# Patient Record
Sex: MALE | Race: WHITE | Employment: FULL TIME | URBAN - METROPOLITAN AREA
[De-identification: names, ages, dates, MRNs, and addresses within clinical notes are randomized per-mention and may not be internally consistent; named-entity substitution may affect disease eponyms.]

---

## 2017-01-20 ENCOUNTER — HOSPITAL ENCOUNTER (OUTPATIENT)
Dept: CT IMAGING | Age: 67
Discharge: HOME | End: 2017-01-20
Attending: NURSE PRACTITIONER
Payer: COMMERCIAL

## 2017-01-20 VITALS — BODY MASS INDEX: 30.48 KG/M2 | HEIGHT: 72 IN | WEIGHT: 225 LBS

## 2017-01-20 DIAGNOSIS — R10.9 LEFT FLANK PAIN: ICD-10-CM

## 2017-01-20 DIAGNOSIS — Z87.442 HISTORY OF KIDNEY STONES: ICD-10-CM

## 2017-01-20 DIAGNOSIS — R31.0 GROSS HEMATURIA: ICD-10-CM

## 2017-01-20 LAB — CREAT BLD-MCNC: 1 MG/DL (ref 0.6–1)

## 2017-01-20 PROCEDURE — 82565 ASSAY OF CREATININE: CPT

## 2017-01-20 PROCEDURE — 74011000258 HC RX REV CODE- 258: Performed by: NURSE PRACTITIONER

## 2017-01-20 PROCEDURE — 74178 CT ABD&PLV WO CNTR FLWD CNTR: CPT

## 2017-01-20 PROCEDURE — 74011636320 HC RX REV CODE- 636/320: Performed by: NURSE PRACTITIONER

## 2017-01-20 RX ORDER — SODIUM CHLORIDE 0.9 % (FLUSH) 0.9 %
10 SYRINGE (ML) INJECTION
Status: COMPLETED | OUTPATIENT
Start: 2017-01-20 | End: 2017-01-20

## 2017-01-20 RX ADMIN — IOPAMIDOL 100 ML: 755 INJECTION, SOLUTION INTRAVENOUS at 08:48

## 2017-01-20 RX ADMIN — Medication 10 ML: at 08:48

## 2017-01-20 RX ADMIN — SODIUM CHLORIDE 100 ML: 900 INJECTION, SOLUTION INTRAVENOUS at 08:48

## 2017-02-24 ENCOUNTER — HOSPITAL ENCOUNTER (OUTPATIENT)
Age: 67
Setting detail: OUTPATIENT SURGERY
Discharge: HOME | End: 2017-02-24
Attending: UROLOGY | Admitting: UROLOGY
Payer: COMMERCIAL

## 2017-02-24 ENCOUNTER — ANESTHESIA EVENT (OUTPATIENT)
Dept: SURGERY | Age: 67
End: 2017-02-24
Payer: COMMERCIAL

## 2017-02-24 ENCOUNTER — ANESTHESIA (OUTPATIENT)
Dept: SURGERY | Age: 67
End: 2017-02-24
Payer: COMMERCIAL

## 2017-02-24 ENCOUNTER — SURGERY (OUTPATIENT)
Age: 67
End: 2017-02-24

## 2017-02-24 VITALS
DIASTOLIC BLOOD PRESSURE: 73 MMHG | BODY MASS INDEX: 29.75 KG/M2 | SYSTOLIC BLOOD PRESSURE: 105 MMHG | WEIGHT: 219.38 LBS | RESPIRATION RATE: 18 BRPM | TEMPERATURE: 97.7 F | HEART RATE: 83 BPM | OXYGEN SATURATION: 99 %

## 2017-02-24 LAB — GLUCOSE BLD STRIP.AUTO-MCNC: 115 MG/DL (ref 65–100)

## 2017-02-24 PROCEDURE — 74011000250 HC RX REV CODE- 250

## 2017-02-24 PROCEDURE — 76210000021 HC REC RM PH II 0.5 TO 1 HR: Performed by: UROLOGY

## 2017-02-24 PROCEDURE — 77030020143 HC AIRWY LARYN INTUB CGAS -A: Performed by: ANESTHESIOLOGY

## 2017-02-24 PROCEDURE — 74011250636 HC RX REV CODE- 250/636: Performed by: ANESTHESIOLOGY

## 2017-02-24 PROCEDURE — 76210000063 HC OR PH I REC FIRST 0.5 HR: Performed by: UROLOGY

## 2017-02-24 PROCEDURE — 74011250637 HC RX REV CODE- 250/637: Performed by: ANESTHESIOLOGY

## 2017-02-24 PROCEDURE — 82962 GLUCOSE BLOOD TEST: CPT

## 2017-02-24 PROCEDURE — 74011250636 HC RX REV CODE- 250/636

## 2017-02-24 PROCEDURE — 50590 FRAGMENTING OF KIDNEY STONE: CPT | Performed by: UROLOGY

## 2017-02-24 PROCEDURE — 76060000033 HC ANESTHESIA 1 TO 1.5 HR: Performed by: UROLOGY

## 2017-02-24 PROCEDURE — 74011250636 HC RX REV CODE- 250/636: Performed by: UROLOGY

## 2017-02-24 PROCEDURE — 76010000149 HC OR TIME 1 TO 1.5 HR: Performed by: UROLOGY

## 2017-02-24 PROCEDURE — 77030032490 HC SLV COMPR SCD KNE COVD -B: Performed by: UROLOGY

## 2017-02-24 RX ORDER — SODIUM CHLORIDE 0.9 % (FLUSH) 0.9 %
5-10 SYRINGE (ML) INJECTION EVERY 8 HOURS
Status: DISCONTINUED | OUTPATIENT
Start: 2017-02-24 | End: 2017-02-24 | Stop reason: HOSPADM

## 2017-02-24 RX ORDER — HYDROMORPHONE HYDROCHLORIDE 2 MG/1
TABLET ORAL
Qty: 40 TAB | Refills: 0 | Status: SHIPPED | OUTPATIENT
Start: 2017-02-24

## 2017-02-24 RX ORDER — HYDROMORPHONE HYDROCHLORIDE 2 MG/ML
0.5 INJECTION, SOLUTION INTRAMUSCULAR; INTRAVENOUS; SUBCUTANEOUS
Status: DISCONTINUED | OUTPATIENT
Start: 2017-02-24 | End: 2017-02-24 | Stop reason: HOSPADM

## 2017-02-24 RX ORDER — MIDAZOLAM HYDROCHLORIDE 1 MG/ML
2 INJECTION, SOLUTION INTRAMUSCULAR; INTRAVENOUS
Status: COMPLETED | OUTPATIENT
Start: 2017-02-24 | End: 2017-02-24

## 2017-02-24 RX ORDER — FAMOTIDINE 20 MG/1
20 TABLET, FILM COATED ORAL ONCE
Status: COMPLETED | OUTPATIENT
Start: 2017-02-24 | End: 2017-02-24

## 2017-02-24 RX ORDER — SODIUM CHLORIDE, SODIUM LACTATE, POTASSIUM CHLORIDE, CALCIUM CHLORIDE 600; 310; 30; 20 MG/100ML; MG/100ML; MG/100ML; MG/100ML
100 INJECTION, SOLUTION INTRAVENOUS CONTINUOUS
Status: DISCONTINUED | OUTPATIENT
Start: 2017-02-24 | End: 2017-02-24 | Stop reason: HOSPADM

## 2017-02-24 RX ORDER — SODIUM CHLORIDE 0.9 % (FLUSH) 0.9 %
5-10 SYRINGE (ML) INJECTION AS NEEDED
Status: DISCONTINUED | OUTPATIENT
Start: 2017-02-24 | End: 2017-02-24 | Stop reason: HOSPADM

## 2017-02-24 RX ORDER — DEXAMETHASONE SODIUM PHOSPHATE 4 MG/ML
INJECTION, SOLUTION INTRA-ARTICULAR; INTRALESIONAL; INTRAMUSCULAR; INTRAVENOUS; SOFT TISSUE AS NEEDED
Status: DISCONTINUED | OUTPATIENT
Start: 2017-02-24 | End: 2017-02-24 | Stop reason: HOSPADM

## 2017-02-24 RX ORDER — FENTANYL CITRATE 50 UG/ML
INJECTION, SOLUTION INTRAMUSCULAR; INTRAVENOUS AS NEEDED
Status: DISCONTINUED | OUTPATIENT
Start: 2017-02-24 | End: 2017-02-24 | Stop reason: HOSPADM

## 2017-02-24 RX ORDER — OXYCODONE HYDROCHLORIDE 5 MG/1
10 TABLET ORAL
Status: DISCONTINUED | OUTPATIENT
Start: 2017-02-24 | End: 2017-02-24 | Stop reason: HOSPADM

## 2017-02-24 RX ORDER — ACETAMINOPHEN 500 MG
1000 TABLET ORAL ONCE
Status: COMPLETED | OUTPATIENT
Start: 2017-02-24 | End: 2017-02-24

## 2017-02-24 RX ORDER — ONDANSETRON 2 MG/ML
INJECTION INTRAMUSCULAR; INTRAVENOUS AS NEEDED
Status: DISCONTINUED | OUTPATIENT
Start: 2017-02-24 | End: 2017-02-24 | Stop reason: HOSPADM

## 2017-02-24 RX ORDER — CEFAZOLIN SODIUM IN 0.9 % NACL 2 G/50 ML
2 INTRAVENOUS SOLUTION, PIGGYBACK (ML) INTRAVENOUS ONCE
Status: COMPLETED | OUTPATIENT
Start: 2017-02-24 | End: 2017-02-24

## 2017-02-24 RX ORDER — LIDOCAINE HYDROCHLORIDE 20 MG/ML
INJECTION, SOLUTION EPIDURAL; INFILTRATION; INTRACAUDAL; PERINEURAL AS NEEDED
Status: DISCONTINUED | OUTPATIENT
Start: 2017-02-24 | End: 2017-02-24 | Stop reason: HOSPADM

## 2017-02-24 RX ORDER — PROPOFOL 10 MG/ML
INJECTION, EMULSION INTRAVENOUS AS NEEDED
Status: DISCONTINUED | OUTPATIENT
Start: 2017-02-24 | End: 2017-02-24 | Stop reason: HOSPADM

## 2017-02-24 RX ORDER — LIDOCAINE HYDROCHLORIDE 10 MG/ML
0.3 INJECTION INFILTRATION; PERINEURAL ONCE
Status: DISCONTINUED | OUTPATIENT
Start: 2017-02-24 | End: 2017-02-24 | Stop reason: HOSPADM

## 2017-02-24 RX ADMIN — FAMOTIDINE 20 MG: 20 TABLET ORAL at 12:34

## 2017-02-24 RX ADMIN — PROPOFOL 180 MG: 10 INJECTION, EMULSION INTRAVENOUS at 14:50

## 2017-02-24 RX ADMIN — DEXAMETHASONE SODIUM PHOSPHATE 4 MG: 4 INJECTION, SOLUTION INTRA-ARTICULAR; INTRALESIONAL; INTRAMUSCULAR; INTRAVENOUS; SOFT TISSUE at 15:01

## 2017-02-24 RX ADMIN — FENTANYL CITRATE 25 MCG: 50 INJECTION, SOLUTION INTRAMUSCULAR; INTRAVENOUS at 15:04

## 2017-02-24 RX ADMIN — FENTANYL CITRATE 25 MCG: 50 INJECTION, SOLUTION INTRAMUSCULAR; INTRAVENOUS at 14:50

## 2017-02-24 RX ADMIN — LIDOCAINE HYDROCHLORIDE 100 MG: 20 INJECTION, SOLUTION EPIDURAL; INFILTRATION; INTRACAUDAL; PERINEURAL at 14:50

## 2017-02-24 RX ADMIN — CEFAZOLIN 2 G: 1 INJECTION, POWDER, FOR SOLUTION INTRAMUSCULAR; INTRAVENOUS; PARENTERAL at 14:44

## 2017-02-24 RX ADMIN — ONDANSETRON 4 MG: 2 INJECTION INTRAMUSCULAR; INTRAVENOUS at 15:01

## 2017-02-24 RX ADMIN — MIDAZOLAM HYDROCHLORIDE 2 MG: 1 INJECTION, SOLUTION INTRAMUSCULAR; INTRAVENOUS at 12:34

## 2017-02-24 RX ADMIN — FENTANYL CITRATE 25 MCG: 50 INJECTION, SOLUTION INTRAMUSCULAR; INTRAVENOUS at 14:58

## 2017-02-24 RX ADMIN — FENTANYL CITRATE 25 MCG: 50 INJECTION, SOLUTION INTRAMUSCULAR; INTRAVENOUS at 15:45

## 2017-02-24 RX ADMIN — ACETAMINOPHEN 1000 MG: 500 TABLET ORAL at 12:34

## 2017-02-24 RX ADMIN — SODIUM CHLORIDE, SODIUM LACTATE, POTASSIUM CHLORIDE, AND CALCIUM CHLORIDE 100 ML/HR: 600; 310; 30; 20 INJECTION, SOLUTION INTRAVENOUS at 11:30

## 2017-02-24 NOTE — BRIEF OP NOTE
BRIEF OPERATIVE NOTE    Date of Procedure: 2/24/2017   Preoperative Diagnosis: Kidney stones [N20.0]  Postoperative Diagnosis: Kidney stones [N20.0]    Procedure(s):  LITHOTRIPSY EXTRACORPOREAL SHOCKWAVE ESWL/ LEFT  Surgeon(s) and Role:      Ashok Clifton MD - Primary            Surgical Staff:  Circ-1: Issa Bro RN  Radiology Technician: Zoltan Moss RT, R, CT  Event Time In   Incision Start 1459   Incision Close [id number] [phone]     Anesthesia: General   Estimated Blood Loss: Minimal  Specimens: None     Findings: See dictated note  Complications: None  Implants: None

## 2017-02-24 NOTE — PERIOP NOTES
Preoperative flank skin condition: left flank warm, dry and intact  Lead shield: yes  Patient ear protection: yes  Gel applied to patient's flank and Lithotripsy head: yes  Starting power level: 3  Shock start time (first  fluoroscopy): 1459  Shock stop time (last lithotripsy shock): 1539  Ending power level: 11  Total shocks: 3500  Total fluoroscopy time: 7.51  Postoperative flank skin condition: left flank pink, warm, dry, and intact

## 2017-02-24 NOTE — DISCHARGE INSTRUCTIONS
Lithotripsy is a way to treat kidney stones without surgery. It is also called extracorporeal shock wave lithotripsy, or ESWL. This treatment uses sound waves to break kidney stones into tiny pieces. These pieces can then pass out of the body in the urine. Lithotripsy does not make your stone disappear. The treatment is meant to crush your stone so that the fragments can be passed. Strain your urine, save any fragments, and take them to your doctor. You may experience slight bruising at the treated site. ACTIVITY  · As tolerated and as directed by your doctor. · Walking and mild exercise help to pass the stone fragments. DIET  · Clear liquids until no nausea and vomiting; then resume light diet for the first day  · Advance to regular diet on second day, unless your doctor orders otherwise. · If nauseated and/ or vomiting, call your doctor. · Drink at least 8 glasses of water a day (avoid caffeinated beverages). PAIN  · Take pain medication as directed. · Call your doctor if pain is NOT relieved by medication. · DO NOT take aspirin or blood thinners until directed by your doctor. CALL YOUR DOCTOR IF   · Expect blood-tinged urine. Call your doctor if it lasts more than 72 hours or if you cannot see through the urine. · Aches, chills, or fever of 101 degree F or above  · Unable to urinate         AFTER ANESTHESIA   · For the first 24 hours: DO NOT Drive, Drink alcoholic beverages, or Make important decisions. · Be aware of dizziness following anesthesia and while taking pain medication. APPOINTMENT DATE/ TIME: Office will call you to schedule your follow up    [address] PHONE NUMBER [phone].       DISCHARGE SUMMARY from Nurse    PATIENT INSTRUCTIONS:    After general anesthesia or intravenous sedation, for 24 hours or while taking prescription Narcotics:  · Limit your activities  · Do not drive and operate hazardous machinery  · Do not make important personal or business decisions  · Do  not drink alcoholic beverages  · If you have not urinated within 8 hours after discharge, please contact your surgeon on call. *  Please give a list of your current medications to your Primary Care Provider. *  Please update this list whenever your medications are discontinued, doses are      changed, or new medications (including over-the-counter products) are added. *  Please carry medication information at all times in case of emergency situations. These are general instructions for a healthy lifestyle:    No smoking/ No tobacco products/ Avoid exposure to second hand smoke    Surgeon General's Warning:  Quitting smoking now greatly reduces serious risk to your health. Obesity, smoking, and sedentary lifestyle greatly increases your risk for illness    A healthy diet, regular physical exercise & weight monitoring are important for maintaining a healthy lifestyle    You may be retaining fluid if you have a history of heart failure or if you experience any of the following symptoms:  Weight gain of 3 pounds or more overnight or 5 pounds in a week, increased swelling in our hands or feet or shortness of breath while lying flat in bed. Please call your doctor as soon as you notice any of these symptoms; do not wait until your next office visit. Recognize signs and symptoms of STROKE:    F-face looks uneven    A-arms unable to move or move unevenly    S-speech slurred or non-existent    T-time-call 911 as soon as signs and symptoms begin-DO NOT go       Back to bed or wait to see if you get better-TIME IS BRAIN.

## 2017-02-24 NOTE — IP AVS SNAPSHOT
Kayla Cheatham 
 
 
 [address] [facility] [address] [facility] 
[phone] Patient: Yobany Pal. MRN: [id number] IAS:0/1/3244 You are allergic to the following Allergen Reactions Adhesive Rash Recent Documentation Weight 99.5 kg Emergency Contacts Name Discharge Info Relation Home Work Mobile Lizz Pierce DISCHARGE CAREGIVER [3] Spouse [3] [phone] About your hospitalization You were admitted on:  February 24, 2017 You last received care in the:  [facility] OP PACU You were discharged on:  February 24, 2017 Unit phone number:  [phone] Why you were hospitalized Your primary diagnosis was:  Not on File Providers Seen During Your Hospitalizations Provider Role Specialty Primary office phone Serg Adams MD Attending Provider Urology [phone] Your Primary Care Physician (PCP) Primary Care Physician Office Phone Office Fax Anupama Morales [phone] Follow-up Information Follow up With Details Comments Contact Info Meli Guerrier MD   [address] [place] [address] [address]  Rommel Barton 
[phone] Serg Adams MD  Office will call you to schedule you're follow up [address] [place] [address] [address] [address] 
[phone] Current Discharge Medication List  
  
START taking these medications Dose & Instructions Dispensing Information Comments Morning Noon Evening Bedtime HYDROmorphone 2 mg tablet Commonly known as:  DILAUDID Your next dose is: Today, Tomorrow Other:  _________  
   
   
 1-2 po q 3 hrs prn pain Quantity:  40 Tab Refills:  0 CONTINUE these medications which have CHANGED Dose & Instructions Dispensing Information Comments Morning Noon Evening Bedtime  
 sildenafil 20 mg tablet Commonly known as:  REVATIO What changed:   
- when to take this 
- reasons to take this Your next dose is: Today, Tomorrow Other:  _________ Dose:  20 mg Take 1 Tab by mouth five (5) times daily. Quantity:  30 Tab Refills:  12 SITagliptin-metFORMIN 100-1,000 mg Tm24 Commonly known as:  JANUMET XR What changed:   
- how to take this - when to take this 
- additional instructions Your next dose is: Today, Tomorrow Other:  _________ Take one tablet by mouth daily. Quantity:  90 Tab Refills:  3 CONTINUE these medications which have NOT CHANGED Dose & Instructions Dispensing Information Comments Morning Noon Evening Bedtime  
 atorvastatin 20 mg tablet Commonly known as:  LIPITOR Your next dose is: Today, Tomorrow Other:  _________ Dose:  20 mg Take 1 Tab by mouth nightly. Indications: am  
 Quantity:  90 Tab Refills:  3  
     
   
   
   
  
 bisoprolol-hydroCHLOROthiazide 2.5-6.25 mg per tablet Commonly known as:  [facility] Your next dose is: Today, Tomorrow Other:  _________ Dose:  1 Tab Take 1 Tab by mouth daily. Quantity:  90 Tab Refills:  3 CALCIUM STOOL SOFTENER 240 mg capsule Generic drug:  docusate calcium Your next dose is: Today, Tomorrow Other:  _________ Dose:  240 mg Take 240 mg by mouth daily as needed. Refills:  0  
     
   
   
   
  
 glimepiride 4 mg tablet Commonly known as:  AMARYL Your next dose is: Today, Tomorrow Other:  _________ Dose:  4 mg Take 1 Tab by mouth two (2) times a day. Quantity:  28 Tab Refills:  0  
     
   
   
   
  
 potassium citrate 10 mEq (1,080 mg) Maribell Glass Commonly known as:  [place] Your next dose is: Today, Tomorrow Other:  _________ Dose:  10 mEq Take 10 mEq by mouth. Refills:  0  
     
   
   
   
  
 ROMIDEPSIN IV Your next dose is: Today, Tomorrow Other:  _________  
   
   
 by IntraVENous route. One infusion a week for three weeks, then one week off. Refills:  0 STOP taking these medications HYDROcodone-acetaminophen 5-325 mg per tablet Commonly known as:  Telma Gonzalez Where to Get Your Medications Information on where to get these meds will be given to you by the nurse or doctor. ! Ask your nurse or doctor about these medications HYDROmorphone 2 mg tablet Discharge Instructions Lithotripsy is a way to treat kidney stones without surgery. It is also called extracorporeal shock wave lithotripsy, or ESWL. This treatment uses sound waves to break kidney stones into tiny pieces. These pieces can then pass out of the body in the urine. Lithotripsy does not make your stone disappear. The treatment is meant to crush your stone so that the fragments can be passed. Strain your urine, save any fragments, and take them to your doctor. You may experience slight bruising at the treated site. ACTIVITY · As tolerated and as directed by your doctor. · Walking and mild exercise help to pass the stone fragments. DIET · Clear liquids until no nausea and vomiting; then resume light diet for the first day · Advance to regular diet on second day, unless your doctor orders otherwise. · If nauseated and/ or vomiting, call your doctor. · Drink at least 8 glasses of water a day (avoid caffeinated beverages). PAIN 
· Take pain medication as directed. · Call your doctor if pain is NOT relieved by medication. · DO NOT take aspirin or blood thinners until directed by your doctor. CALL YOUR DOCTOR IF  
· Expect blood-tinged urine. Call your doctor if it lasts more than 72 hours or if you cannot see through the urine. · Aches, chills, or fever of 101 degree F or above · Unable to urinate AFTER ANESTHESIA · For the first 24 hours: DO NOT Drive, Drink alcoholic beverages, or Make important decisions. · Be aware of dizziness following anesthesia and while taking pain medication. APPOINTMENT DATE/ TIME: Office will call you to schedule your follow up YOUR DOCTOR'S PHONE NUMBER [phone]. DISCHARGE SUMMARY from Nurse PATIENT INSTRUCTIONS: 
 
After general anesthesia or intravenous sedation, for 24 hours or while taking prescription Narcotics: · Limit your activities · Do not drive and operate hazardous machinery · Do not make important personal or business decisions · Do  not drink alcoholic beverages · If you have not urinated within 8 hours after discharge, please contact your surgeon on call. *  Please give a list of your current medications to your Primary Care Provider. *  Please update this list whenever your medications are discontinued, doses are 
    changed, or new medications (including over-the-counter products) are added. *  Please carry medication information at all times in case of emergency situations. These are general instructions for a healthy lifestyle: No smoking/ No tobacco products/ Avoid exposure to second hand smoke Surgeon General's Warning:  Quitting smoking now greatly reduces serious risk to your health. Obesity, smoking, and sedentary lifestyle greatly increases your risk for illness A healthy diet, regular physical exercise & weight monitoring are important for maintaining a healthy lifestyle You may be retaining fluid if you have a history of heart failure or if you experience any of the following symptoms:  Weight gain of 3 pounds or more overnight or 5 pounds in a week, increased swelling in our hands or feet or shortness of breath while lying flat in bed. Please call your doctor as soon as you notice any of these symptoms; do not wait until your next office visit. Recognize signs and symptoms of STROKE: 
 
F-face looks uneven A-arms unable to move or move unevenly S-speech slurred or non-existent T-time-call 911 as soon as signs and symptoms begin-DO NOT go Back to bed or wait to see if you get better-TIME IS BRAIN. Discharge Orders None Introducing [facility]! [facility] introduces [employer] patient portal. Now you can access parts of your medical record, email your doctor's office, and request medication refills online. 1. In your internet browser, go to [email or web address] 2. Click on the First Time User? Click Here link in the Sign In box. You will see the New Member Sign Up page. 3. Enter your [employer] Access Code exactly as it appears below. You will not need to use this code after youve completed the sign-up process. If you do not sign up before the expiration date, you must request a new code. · [employer] Access Code: [email or web address] Expires: 5/24/2017  9:32 AM 
 
4. Enter the last four digits of your Social Security Number (xxxx) and Date of Birth (mm/dd/yyyy) as indicated and click Submit. You will be taken to the next sign-up page. 5. Create a [employer] ID. This will be your [employer] login ID and cannot be changed, so think of one that is secure and easy to remember. 6. Create a [employer] password. You can change your password at any time. 7. Enter your Password Reset Question and Answer. This can be used at a later time if you forget your password. 8. Enter your e-mail address. You will receive e-mail notification when new information is available in [address] 9. Click Sign Up. You can now view and download portions of your medical record. 10. Click the Download Summary menu link to download a portable copy of your medical information. If you have questions, please visit the Frequently Asked Questions section of the [employer] website.  Remember, [employer] is NOT to be used for urgent needs. For medical emergencies, dial 911. Now available from your iPhone and Android! General Information Please provide this summary of care documentation to your next provider. Patient Signature:  ____________________________________________________________ Date:  ____________________________________________________________  
  
Molly Hero Provider Signature:  ____________________________________________________________ Date:  ____________________________________________________________

## 2017-02-24 NOTE — PERIOP NOTES
Pt discharged home with rx x1 (dilaudid) and instructions on follow up care. Verbal understanding of all instructions by patient and wife Franc Moe). All questions answered prior to discharge. All belongings taken with pt. Pt taken to car by wheelchair by this RN.

## 2017-02-24 NOTE — ANESTHESIA PREPROCEDURE EVALUATION
Anesthetic History               Review of Systems / Medical History  Patient summary reviewed and pertinent labs reviewed    Pulmonary        Sleep apnea: CPAP           Neuro/Psych   Within defined limits           Cardiovascular    Hypertension              Exercise tolerance: >4 METS     GI/Hepatic/Renal                Endo/Other    Diabetes: well controlled, type 2    Cancer (t cell lymphoma)     Other Findings              Physical Exam    Airway  Mallampati: II  TM Distance: 4 - 6 cm  Neck ROM: normal range of motion   Mouth opening: Normal     Cardiovascular    Rhythm: regular  Rate: normal    Murmur: Grade 4, Mitral area     Dental  No notable dental hx       Pulmonary  Breath sounds clear to auscultation               Abdominal         Other Findings            Anesthetic Plan    ASA: 3  Anesthesia type: general          Induction: Intravenous  Anesthetic plan and risks discussed with: Patient and Spouse

## 2017-02-24 NOTE — ANESTHESIA POSTPROCEDURE EVALUATION
Post-Anesthesia Evaluation and Assessment    Patient: Alvina Odom MRN: [id number]  SSN: [id number]    YOB: 1950  Age: 73 y.o. Sex: male       Cardiovascular Function/Vital Signs  Visit Vitals    /76    Pulse 73    Temp 36.5 °C (97.7 °F)    Resp 20    Wt 99.5 kg (219 lb 6 oz)    SpO2 96%    BMI 29.75 kg/m2       Patient is status post general anesthesia for Procedure(s):  LITHOTRIPSY EXTRACORPOREAL SHOCKWAVE ESWL/ LEFT. Nausea/Vomiting: None    Postoperative hydration reviewed and adequate. Pain:  Pain Scale 1: Numeric (0 - 10) (02/24/17 1600)  Pain Intensity 1: 0 (02/24/17 1600)   Managed    Neurological Status:   Neuro (WDL): Exceptions to WDL (02/24/17 1551)  Neuro  Neurologic State: Drowsy (02/24/17 1551)   At baseline    Mental Status and Level of Consciousness: Alert and oriented     Pulmonary Status:   O2 Device: Nasal cannula (02/24/17 1600)   Adequate oxygenation and airway patent    Complications related to anesthesia: None    Post-anesthesia assessment completed.  No concerns    Signed By: Jonh Koo MD     February 24, 2017

## 2017-02-25 NOTE — OP NOTES
[facility] [address]   OPERATIVE REPORT       Name:  Gloria Austin   MR#:  [id number]   :  1950   Account #:  [de-identified]   Date of Adm:  2017       DATE OF SURGERY: 2017    PREOPERATIVE DIAGNOSIS: Left intrarenal stone. POSTOPERATIVE DIAGNOSIS: Left intrarenal stone. PROCEDURE: ESWL. ANESTHESIA: General.    NUMBER OF SHOCKS: 3500. MAXIMAL POWER LEVEL: 11.    Postoperative films show only minimal changes of the margins of   the stone so it is unclear if this has been adequately fractured   or not.         MD WON Roman / TED   D:  2017   16:05   T:  2017   21:08   Job #:  [id number]

## 2017-03-02 ENCOUNTER — HOSPITAL ENCOUNTER (OUTPATIENT)
Dept: SURGERY | Age: 67
Discharge: HOME | End: 2017-03-02
Payer: COMMERCIAL

## 2017-03-02 ENCOUNTER — ANESTHESIA EVENT (OUTPATIENT)
Dept: SURGERY | Age: 67
End: 2017-03-02
Payer: COMMERCIAL

## 2017-03-02 VITALS
HEIGHT: 72 IN | TEMPERATURE: 99.3 F | OXYGEN SATURATION: 99 % | DIASTOLIC BLOOD PRESSURE: 90 MMHG | RESPIRATION RATE: 16 BRPM | SYSTOLIC BLOOD PRESSURE: 134 MMHG | BODY MASS INDEX: 29.66 KG/M2 | HEART RATE: 89 BPM | WEIGHT: 219 LBS

## 2017-03-02 LAB
ANION GAP BLD CALC-SCNC: 9 MMOL/L (ref 7–16)
APTT PPP: 27.7 SEC (ref 23.5–31.7)
BASOPHILS # BLD AUTO: 0 K/UL (ref 0–0.2)
BASOPHILS # BLD: 0 % (ref 0–2)
BUN SERPL-MCNC: 20 MG/DL (ref 8–23)
CALCIUM SERPL-MCNC: 8.7 MG/DL (ref 8.3–10.4)
CHLORIDE SERPL-SCNC: 96 MMOL/L (ref 98–107)
CO2 SERPL-SCNC: 31 MMOL/L (ref 21–32)
CREAT SERPL-MCNC: 1.77 MG/DL (ref 0.8–1.5)
DIFFERENTIAL METHOD BLD: ABNORMAL
EOSINOPHIL # BLD: 0.1 K/UL (ref 0–0.8)
EOSINOPHIL NFR BLD: 2 % (ref 0.5–7.8)
ERYTHROCYTE [DISTWIDTH] IN BLOOD BY AUTOMATED COUNT: 13.8 % (ref 11.9–14.6)
GLUCOSE SERPL-MCNC: 93 MG/DL (ref 65–100)
HCT VFR BLD AUTO: 36.5 % (ref 41.1–50.3)
HGB BLD-MCNC: 12.4 G/DL (ref 13.6–17.2)
IMM GRANULOCYTES # BLD: 0 K/UL (ref 0–0.5)
IMM GRANULOCYTES NFR BLD AUTO: 0.6 % (ref 0–5)
INR PPP: 1 (ref 0.9–1.2)
LYMPHOCYTES # BLD AUTO: 19 % (ref 13–44)
LYMPHOCYTES # BLD: 0.9 K/UL (ref 0.5–4.6)
MCH RBC QN AUTO: 31.8 PG (ref 26.1–32.9)
MCHC RBC AUTO-ENTMCNC: 34 G/DL (ref 31.4–35)
MCV RBC AUTO: 93.6 FL (ref 79.6–97.8)
MONOCYTES # BLD: 0.5 K/UL (ref 0.1–1.3)
MONOCYTES NFR BLD AUTO: 10 % (ref 4–12)
NEUTS SEG # BLD: 3.5 K/UL (ref 1.7–8.2)
NEUTS SEG NFR BLD AUTO: 68 % (ref 43–78)
PLATELET # BLD AUTO: 267 K/UL (ref 150–450)
PMV BLD AUTO: 8.7 FL (ref 10.8–14.1)
POTASSIUM SERPL-SCNC: 4 MMOL/L (ref 3.5–5.1)
PROTHROMBIN TIME: 11.2 SEC (ref 9.6–12)
RBC # BLD AUTO: 3.9 M/UL (ref 4.23–5.67)
SODIUM SERPL-SCNC: 136 MMOL/L (ref 136–145)
WBC # BLD AUTO: 5 K/UL (ref 4.3–11.1)

## 2017-03-02 PROCEDURE — 85730 THROMBOPLASTIN TIME PARTIAL: CPT | Performed by: UROLOGY

## 2017-03-02 PROCEDURE — 85610 PROTHROMBIN TIME: CPT | Performed by: UROLOGY

## 2017-03-02 PROCEDURE — 85025 COMPLETE CBC W/AUTO DIFF WBC: CPT | Performed by: UROLOGY

## 2017-03-02 PROCEDURE — 80048 BASIC METABOLIC PNL TOTAL CA: CPT | Performed by: UROLOGY

## 2017-03-02 NOTE — PERIOP NOTES
Patient verified name, , and surgery as listed in [facility]. TYPE  CASE:ll  Orders per surgeon: were Received  Labs per surgeon:cbc, bmp, pt, ptt  Labs per anesthesia protocol: glucose on arrival   EKG  :  Request Ekg from 3/3/2016 and printed ekg for comparison from  (unchanged), patient has no hx of chest pain, CRUZ or CAD    Patient has a dry cough which he has been to family md for and was told it should be okay for his surgery and Dr Ned Clements here and said patient was fine to proceed . Patient provided with handouts including guide to surgery , transfusions, pain management and hand hygiene for the family and community. Pt verbalizes understanding of all pre-op instructions . Instructed that family must be present in building at all times. Hibiclens and instructions given per hospital policy. Instructed patient to continue  previous medications as prescribed prior to surgery and  to take the following medications the day of surgery according to anesthesia guidelines : zantac and ziac and dilaudid if needed    Original medication prescription bottles were not visualized during patient appointment. Continue all previous medications unless otherwise directed. Instructed patient to hold  the following medications prior to surgery: oral diabetic agents in am only      Patient verbalized understanding of all instructions and provided all medical/health information to the best of their ability.

## 2017-03-03 ENCOUNTER — APPOINTMENT (OUTPATIENT)
Dept: INTERVENTIONAL RADIOLOGY/VASCULAR | Age: 67
End: 2017-03-03
Attending: UROLOGY
Payer: COMMERCIAL

## 2017-03-03 ENCOUNTER — ANESTHESIA (OUTPATIENT)
Dept: SURGERY | Age: 67
End: 2017-03-03
Payer: COMMERCIAL

## 2017-03-03 ENCOUNTER — APPOINTMENT (OUTPATIENT)
Dept: GENERAL RADIOLOGY | Age: 67
End: 2017-03-03
Attending: UROLOGY
Payer: COMMERCIAL

## 2017-03-03 ENCOUNTER — HOSPITAL ENCOUNTER (OUTPATIENT)
Age: 67
Setting detail: OBSERVATION
Discharge: HOME | End: 2017-03-05
Attending: UROLOGY | Admitting: UROLOGY
Payer: COMMERCIAL

## 2017-03-03 DIAGNOSIS — N20.0 KIDNEY STONE: ICD-10-CM

## 2017-03-03 LAB — GLUCOSE BLD STRIP.AUTO-MCNC: 117 MG/DL (ref 65–100)

## 2017-03-03 PROCEDURE — 76010000131 HC OR TIME 2 TO 2.5 HR: Performed by: UROLOGY

## 2017-03-03 PROCEDURE — 77030026962 HC PRB RNL CYBERWND OCOA -F: Performed by: UROLOGY

## 2017-03-03 PROCEDURE — 74011000250 HC RX REV CODE- 250: Performed by: RADIOLOGY

## 2017-03-03 PROCEDURE — C1769 GUIDE WIRE: HCPCS | Performed by: UROLOGY

## 2017-03-03 PROCEDURE — C1769 GUIDE WIRE: HCPCS

## 2017-03-03 PROCEDURE — 74011250636 HC RX REV CODE- 250/636: Performed by: ANESTHESIOLOGY

## 2017-03-03 PROCEDURE — 77030032490 HC SLV COMPR SCD KNE COVD -B: Performed by: UROLOGY

## 2017-03-03 PROCEDURE — 77030019927 HC TBNG IRR CYSTO BAXT -A: Performed by: UROLOGY

## 2017-03-03 PROCEDURE — 74011636320 HC RX REV CODE- 636/320: Performed by: UROLOGY

## 2017-03-03 PROCEDURE — 77030020782 HC GWN BAIR PAWS FLX 3M -B: Performed by: ANESTHESIOLOGY

## 2017-03-03 PROCEDURE — 76060000035 HC ANESTHESIA 2 TO 2.5 HR: Performed by: UROLOGY

## 2017-03-03 PROCEDURE — 74011250636 HC RX REV CODE- 250/636: Performed by: UROLOGY

## 2017-03-03 PROCEDURE — 77030005521 HC CATH URETH FOL38 BARD -B: Performed by: UROLOGY

## 2017-03-03 PROCEDURE — 77030006974 HC BSKT URET RTVR BSC -C: Performed by: UROLOGY

## 2017-03-03 PROCEDURE — C2617 STENT, NON-COR, TEM W/O DEL: HCPCS | Performed by: UROLOGY

## 2017-03-03 PROCEDURE — 74011636320 HC RX REV CODE- 636/320: Performed by: RADIOLOGY

## 2017-03-03 PROCEDURE — 77030019908 HC STETH ESOPH SIMS -A: Performed by: ANESTHESIOLOGY

## 2017-03-03 PROCEDURE — 77030018846 HC SOL IRR STRL H20 ICUM -A: Performed by: UROLOGY

## 2017-03-03 PROCEDURE — 77030002996 HC SUT SLK J&J -A

## 2017-03-03 PROCEDURE — 74011000258 HC RX REV CODE- 258: Performed by: UROLOGY

## 2017-03-03 PROCEDURE — 77030018673: Performed by: UROLOGY

## 2017-03-03 PROCEDURE — 82962 GLUCOSE BLOOD TEST: CPT

## 2017-03-03 PROCEDURE — 74011250636 HC RX REV CODE- 250/636

## 2017-03-03 PROCEDURE — 77030034850: Performed by: UROLOGY

## 2017-03-03 PROCEDURE — 74011250637 HC RX REV CODE- 250/637: Performed by: UROLOGY

## 2017-03-03 PROCEDURE — 77030008703 HC TU ET UNCUF COVD -A: Performed by: ANESTHESIOLOGY

## 2017-03-03 PROCEDURE — 74011000250 HC RX REV CODE- 250

## 2017-03-03 PROCEDURE — C1726 CATH, BAL DIL, NON-VASCULAR: HCPCS | Performed by: UROLOGY

## 2017-03-03 PROCEDURE — 77030003504 HC NDL BIOP TISS COOK -A

## 2017-03-03 PROCEDURE — 50433 PLMT NEPHROURETERAL CATHETER: CPT

## 2017-03-03 PROCEDURE — 77030019940 HC BLNKT HYPOTHRM STRY -B: Performed by: ANESTHESIOLOGY

## 2017-03-03 PROCEDURE — C1894 INTRO/SHEATH, NON-LASER: HCPCS | Performed by: UROLOGY

## 2017-03-03 PROCEDURE — 77030033647: Performed by: UROLOGY

## 2017-03-03 PROCEDURE — C1894 INTRO/SHEATH, NON-LASER: HCPCS

## 2017-03-03 PROCEDURE — 77030002986 HC SUT PROL J&J -A

## 2017-03-03 PROCEDURE — 77030013058 HC DEV INFL ANGIO BSC -B: Performed by: UROLOGY

## 2017-03-03 PROCEDURE — 74011000250 HC RX REV CODE- 250: Performed by: UROLOGY

## 2017-03-03 PROCEDURE — 77030002996 HC SUT SLK J&J -A: Performed by: UROLOGY

## 2017-03-03 PROCEDURE — 77030018836 HC SOL IRR NACL ICUM -A: Performed by: UROLOGY

## 2017-03-03 PROCEDURE — 77030012085 HC BSKT URET GEMINI BSC -C: Performed by: UROLOGY

## 2017-03-03 PROCEDURE — 77030010545: Performed by: UROLOGY

## 2017-03-03 PROCEDURE — 77030021532 HC CATH ANGI DX IMPRS MRTM -B

## 2017-03-03 PROCEDURE — 76210000006 HC OR PH I REC 0.5 TO 1 HR: Performed by: UROLOGY

## 2017-03-03 PROCEDURE — 77030008477 HC STYL SATN SLP COVD -A: Performed by: ANESTHESIOLOGY

## 2017-03-03 PROCEDURE — 99152 MOD SED SAME PHYS/QHP 5/>YRS: CPT

## 2017-03-03 DEVICE — URETERAL STENT
Type: IMPLANTABLE DEVICE | Site: URETER | Status: FUNCTIONAL
Brand: PERCUFLEX™ PLUS

## 2017-03-03 RX ORDER — SODIUM CHLORIDE 0.9 % (FLUSH) 0.9 %
5-10 SYRINGE (ML) INJECTION AS NEEDED
Status: DISCONTINUED | OUTPATIENT
Start: 2017-03-03 | End: 2017-03-03

## 2017-03-03 RX ORDER — DIPHENHYDRAMINE HYDROCHLORIDE 50 MG/ML
12.5 INJECTION, SOLUTION INTRAMUSCULAR; INTRAVENOUS
Status: DISCONTINUED | OUTPATIENT
Start: 2017-03-03 | End: 2017-03-05 | Stop reason: HOSPADM

## 2017-03-03 RX ORDER — MIDAZOLAM HYDROCHLORIDE 1 MG/ML
2 INJECTION, SOLUTION INTRAMUSCULAR; INTRAVENOUS ONCE
Status: DISCONTINUED | OUTPATIENT
Start: 2017-03-03 | End: 2017-03-03 | Stop reason: HOSPADM

## 2017-03-03 RX ORDER — OXYCODONE HYDROCHLORIDE 5 MG/1
10 TABLET ORAL
Status: DISCONTINUED | OUTPATIENT
Start: 2017-03-03 | End: 2017-03-03

## 2017-03-03 RX ORDER — DEXAMETHASONE SODIUM PHOSPHATE 4 MG/ML
INJECTION, SOLUTION INTRA-ARTICULAR; INTRALESIONAL; INTRAMUSCULAR; INTRAVENOUS; SOFT TISSUE AS NEEDED
Status: DISCONTINUED | OUTPATIENT
Start: 2017-03-03 | End: 2017-03-03 | Stop reason: HOSPADM

## 2017-03-03 RX ORDER — ATORVASTATIN CALCIUM 10 MG/1
20 TABLET, FILM COATED ORAL
Status: DISCONTINUED | OUTPATIENT
Start: 2017-03-03 | End: 2017-03-05 | Stop reason: HOSPADM

## 2017-03-03 RX ORDER — ATROPA BELLADONNA AND OPIUM 16.2; 6 MG/1; MG/1
1 SUPPOSITORY RECTAL
Status: DISCONTINUED | OUTPATIENT
Start: 2017-03-03 | End: 2017-03-05 | Stop reason: HOSPADM

## 2017-03-03 RX ORDER — FENTANYL CITRATE 50 UG/ML
100 INJECTION, SOLUTION INTRAMUSCULAR; INTRAVENOUS ONCE
Status: DISCONTINUED | OUTPATIENT
Start: 2017-03-03 | End: 2017-03-03 | Stop reason: HOSPADM

## 2017-03-03 RX ORDER — CEFAZOLIN SODIUM IN 0.9 % NACL 2 G/50 ML
2 INTRAVENOUS SOLUTION, PIGGYBACK (ML) INTRAVENOUS ONCE
Status: DISCONTINUED | OUTPATIENT
Start: 2017-03-03 | End: 2017-03-03 | Stop reason: SDUPTHER

## 2017-03-03 RX ORDER — HYDROMORPHONE HYDROCHLORIDE 1 MG/ML
0.5 INJECTION, SOLUTION INTRAMUSCULAR; INTRAVENOUS; SUBCUTANEOUS
Status: DISCONTINUED | OUTPATIENT
Start: 2017-03-03 | End: 2017-03-05 | Stop reason: HOSPADM

## 2017-03-03 RX ORDER — SODIUM CHLORIDE 9 MG/ML
25 INJECTION, SOLUTION INTRAVENOUS ONCE
Status: DISCONTINUED | OUTPATIENT
Start: 2017-03-03 | End: 2017-03-03 | Stop reason: HOSPADM

## 2017-03-03 RX ORDER — DIPHENHYDRAMINE HYDROCHLORIDE 50 MG/ML
12.5 INJECTION, SOLUTION INTRAMUSCULAR; INTRAVENOUS
Status: DISCONTINUED | OUTPATIENT
Start: 2017-03-03 | End: 2017-03-03

## 2017-03-03 RX ORDER — ACETAMINOPHEN 500 MG
1000 TABLET ORAL ONCE
Status: DISCONTINUED | OUTPATIENT
Start: 2017-03-03 | End: 2017-03-03

## 2017-03-03 RX ORDER — LORAZEPAM 2 MG/ML
1 INJECTION INTRAMUSCULAR
Status: DISCONTINUED | OUTPATIENT
Start: 2017-03-03 | End: 2017-03-05 | Stop reason: HOSPADM

## 2017-03-03 RX ORDER — MIDAZOLAM HYDROCHLORIDE 1 MG/ML
2 INJECTION, SOLUTION INTRAMUSCULAR; INTRAVENOUS
Status: DISCONTINUED | OUTPATIENT
Start: 2017-03-03 | End: 2017-03-03 | Stop reason: HOSPADM

## 2017-03-03 RX ORDER — HYDROMORPHONE HYDROCHLORIDE 4 MG/1
4 TABLET ORAL
Status: DISCONTINUED | OUTPATIENT
Start: 2017-03-03 | End: 2017-03-05 | Stop reason: HOSPADM

## 2017-03-03 RX ORDER — DIPHENHYDRAMINE HYDROCHLORIDE 50 MG/ML
12.5-5 INJECTION, SOLUTION INTRAMUSCULAR; INTRAVENOUS ONCE
Status: DISCONTINUED | OUTPATIENT
Start: 2017-03-03 | End: 2017-03-03 | Stop reason: HOSPADM

## 2017-03-03 RX ORDER — LIDOCAINE HYDROCHLORIDE 20 MG/ML
50-200 INJECTION, SOLUTION INFILTRATION; PERINEURAL ONCE
Status: COMPLETED | OUTPATIENT
Start: 2017-03-03 | End: 2017-03-03

## 2017-03-03 RX ORDER — GLYCOPYRROLATE 0.2 MG/ML
INJECTION INTRAMUSCULAR; INTRAVENOUS AS NEEDED
Status: DISCONTINUED | OUTPATIENT
Start: 2017-03-03 | End: 2017-03-03 | Stop reason: HOSPADM

## 2017-03-03 RX ORDER — NALOXONE HYDROCHLORIDE 0.4 MG/ML
0.4 INJECTION, SOLUTION INTRAMUSCULAR; INTRAVENOUS; SUBCUTANEOUS AS NEEDED
Status: DISCONTINUED | OUTPATIENT
Start: 2017-03-03 | End: 2017-03-05 | Stop reason: HOSPADM

## 2017-03-03 RX ORDER — DOCUSATE CALCIUM 240 MG
240 CAPSULE ORAL
Status: DISCONTINUED | OUTPATIENT
Start: 2017-03-03 | End: 2017-03-05 | Stop reason: HOSPADM

## 2017-03-03 RX ORDER — NEOSTIGMINE METHYLSULFATE 1 MG/ML
INJECTION INTRAVENOUS AS NEEDED
Status: DISCONTINUED | OUTPATIENT
Start: 2017-03-03 | End: 2017-03-03 | Stop reason: HOSPADM

## 2017-03-03 RX ORDER — GLIMEPIRIDE 4 MG/1
4 TABLET ORAL 2 TIMES DAILY
Status: DISCONTINUED | OUTPATIENT
Start: 2017-03-03 | End: 2017-03-05 | Stop reason: HOSPADM

## 2017-03-03 RX ORDER — METFORMIN HYDROCHLORIDE 500 MG/1
1000 TABLET ORAL
Status: DISCONTINUED | OUTPATIENT
Start: 2017-03-04 | End: 2017-03-05 | Stop reason: HOSPADM

## 2017-03-03 RX ORDER — LORATADINE 10 MG/1
10 TABLET ORAL DAILY
Status: DISCONTINUED | OUTPATIENT
Start: 2017-03-04 | End: 2017-03-05 | Stop reason: HOSPADM

## 2017-03-03 RX ORDER — ONDANSETRON 2 MG/ML
INJECTION INTRAMUSCULAR; INTRAVENOUS AS NEEDED
Status: DISCONTINUED | OUTPATIENT
Start: 2017-03-03 | End: 2017-03-03 | Stop reason: HOSPADM

## 2017-03-03 RX ORDER — FENTANYL CITRATE 50 UG/ML
INJECTION, SOLUTION INTRAMUSCULAR; INTRAVENOUS AS NEEDED
Status: DISCONTINUED | OUTPATIENT
Start: 2017-03-03 | End: 2017-03-03 | Stop reason: HOSPADM

## 2017-03-03 RX ORDER — HYDROMORPHONE HYDROCHLORIDE 2 MG/ML
0.5 INJECTION, SOLUTION INTRAMUSCULAR; INTRAVENOUS; SUBCUTANEOUS
Status: DISCONTINUED | OUTPATIENT
Start: 2017-03-03 | End: 2017-03-03

## 2017-03-03 RX ORDER — FENTANYL CITRATE 50 UG/ML
25-100 INJECTION, SOLUTION INTRAMUSCULAR; INTRAVENOUS
Status: DISCONTINUED | OUTPATIENT
Start: 2017-03-03 | End: 2017-03-03 | Stop reason: HOSPADM

## 2017-03-03 RX ORDER — FAMOTIDINE 20 MG/1
20 TABLET, FILM COATED ORAL 2 TIMES DAILY
Status: DISCONTINUED | OUTPATIENT
Start: 2017-03-03 | End: 2017-03-05 | Stop reason: HOSPADM

## 2017-03-03 RX ORDER — SODIUM CHLORIDE 0.9 % (FLUSH) 0.9 %
5-10 SYRINGE (ML) INJECTION EVERY 8 HOURS
Status: DISCONTINUED | OUTPATIENT
Start: 2017-03-03 | End: 2017-03-03 | Stop reason: HOSPADM

## 2017-03-03 RX ORDER — SODIUM CHLORIDE, SODIUM LACTATE, POTASSIUM CHLORIDE, CALCIUM CHLORIDE 600; 310; 30; 20 MG/100ML; MG/100ML; MG/100ML; MG/100ML
100 INJECTION, SOLUTION INTRAVENOUS CONTINUOUS
Status: DISCONTINUED | OUTPATIENT
Start: 2017-03-03 | End: 2017-03-03

## 2017-03-03 RX ORDER — BISOPROLOL FUMARATE AND HYDROCHLOROTHIAZIDE 2.5; 6.25 MG/1; MG/1
1 TABLET ORAL DAILY
Status: DISCONTINUED | OUTPATIENT
Start: 2017-03-04 | End: 2017-03-05 | Stop reason: HOSPADM

## 2017-03-03 RX ORDER — ROCURONIUM BROMIDE 10 MG/ML
INJECTION, SOLUTION INTRAVENOUS AS NEEDED
Status: DISCONTINUED | OUTPATIENT
Start: 2017-03-03 | End: 2017-03-03 | Stop reason: HOSPADM

## 2017-03-03 RX ORDER — MIDAZOLAM HYDROCHLORIDE 1 MG/ML
.5-2 INJECTION, SOLUTION INTRAMUSCULAR; INTRAVENOUS
Status: DISCONTINUED | OUTPATIENT
Start: 2017-03-03 | End: 2017-03-03 | Stop reason: HOSPADM

## 2017-03-03 RX ORDER — FLUMAZENIL 0.1 MG/ML
0.2 INJECTION INTRAVENOUS
Status: DISCONTINUED | OUTPATIENT
Start: 2017-03-03 | End: 2017-03-03

## 2017-03-03 RX ORDER — LIDOCAINE HYDROCHLORIDE 20 MG/ML
INJECTION, SOLUTION EPIDURAL; INFILTRATION; INTRACAUDAL; PERINEURAL AS NEEDED
Status: DISCONTINUED | OUTPATIENT
Start: 2017-03-03 | End: 2017-03-03 | Stop reason: HOSPADM

## 2017-03-03 RX ORDER — POTASSIUM CITRATE 10 MEQ/1
10 TABLET, EXTENDED RELEASE ORAL 2 TIMES DAILY
Status: DISCONTINUED | OUTPATIENT
Start: 2017-03-03 | End: 2017-03-05 | Stop reason: HOSPADM

## 2017-03-03 RX ORDER — OXYCODONE HYDROCHLORIDE 5 MG/1
5 TABLET ORAL
Status: DISCONTINUED | OUTPATIENT
Start: 2017-03-03 | End: 2017-03-03

## 2017-03-03 RX ORDER — SODIUM CHLORIDE 0.9 % (FLUSH) 0.9 %
5-10 SYRINGE (ML) INJECTION AS NEEDED
Status: DISCONTINUED | OUTPATIENT
Start: 2017-03-03 | End: 2017-03-05 | Stop reason: HOSPADM

## 2017-03-03 RX ORDER — SODIUM CHLORIDE 0.9 % (FLUSH) 0.9 %
5-10 SYRINGE (ML) INJECTION AS NEEDED
Status: DISCONTINUED | OUTPATIENT
Start: 2017-03-03 | End: 2017-03-03 | Stop reason: HOSPADM

## 2017-03-03 RX ORDER — LIDOCAINE HYDROCHLORIDE 10 MG/ML
0.1 INJECTION INFILTRATION; PERINEURAL AS NEEDED
Status: DISCONTINUED | OUTPATIENT
Start: 2017-03-03 | End: 2017-03-03 | Stop reason: HOSPADM

## 2017-03-03 RX ORDER — SODIUM CHLORIDE 0.9 % (FLUSH) 0.9 %
5-10 SYRINGE (ML) INJECTION EVERY 8 HOURS
Status: DISCONTINUED | OUTPATIENT
Start: 2017-03-03 | End: 2017-03-05 | Stop reason: HOSPADM

## 2017-03-03 RX ORDER — CEFAZOLIN SODIUM IN 0.9 % NACL 2 G/50 ML
2 INTRAVENOUS SOLUTION, PIGGYBACK (ML) INTRAVENOUS ONCE
Status: COMPLETED | OUTPATIENT
Start: 2017-03-03 | End: 2017-03-03

## 2017-03-03 RX ORDER — PROPOFOL 10 MG/ML
INJECTION, EMULSION INTRAVENOUS AS NEEDED
Status: DISCONTINUED | OUTPATIENT
Start: 2017-03-03 | End: 2017-03-03 | Stop reason: HOSPADM

## 2017-03-03 RX ORDER — ACETAMINOPHEN 325 MG/1
650 TABLET ORAL
Status: DISCONTINUED | OUTPATIENT
Start: 2017-03-03 | End: 2017-03-05 | Stop reason: HOSPADM

## 2017-03-03 RX ORDER — ONDANSETRON 2 MG/ML
4 INJECTION INTRAMUSCULAR; INTRAVENOUS
Status: DISCONTINUED | OUTPATIENT
Start: 2017-03-03 | End: 2017-03-05 | Stop reason: HOSPADM

## 2017-03-03 RX ORDER — SODIUM CHLORIDE, SODIUM LACTATE, POTASSIUM CHLORIDE, CALCIUM CHLORIDE 600; 310; 30; 20 MG/100ML; MG/100ML; MG/100ML; MG/100ML
100 INJECTION, SOLUTION INTRAVENOUS CONTINUOUS
Status: DISCONTINUED | OUTPATIENT
Start: 2017-03-03 | End: 2017-03-03 | Stop reason: HOSPADM

## 2017-03-03 RX ORDER — CEFAZOLIN SODIUM IN 0.9 % NACL 2 G/50 ML
2 INTRAVENOUS SOLUTION, PIGGYBACK (ML) INTRAVENOUS ONCE
Status: ACTIVE | OUTPATIENT
Start: 2017-03-03 | End: 2017-03-04

## 2017-03-03 RX ORDER — NALOXONE HYDROCHLORIDE 0.4 MG/ML
0.2 INJECTION, SOLUTION INTRAMUSCULAR; INTRAVENOUS; SUBCUTANEOUS AS NEEDED
Status: DISCONTINUED | OUTPATIENT
Start: 2017-03-03 | End: 2017-03-03

## 2017-03-03 RX ADMIN — SODIUM CHLORIDE: 450 INJECTION, SOLUTION INTRAVENOUS at 20:32

## 2017-03-03 RX ADMIN — ROCURONIUM BROMIDE 50 MG: 10 INJECTION, SOLUTION INTRAVENOUS at 14:57

## 2017-03-03 RX ADMIN — IOPAMIDOL 10 ML: 755 INJECTION, SOLUTION INTRAVENOUS at 14:31

## 2017-03-03 RX ADMIN — Medication 5 ML: at 20:34

## 2017-03-03 RX ADMIN — ONDANSETRON 4 MG: 2 INJECTION INTRAMUSCULAR; INTRAVENOUS at 15:40

## 2017-03-03 RX ADMIN — MIDAZOLAM HYDROCHLORIDE 0.5 MG: 1 INJECTION, SOLUTION INTRAMUSCULAR; INTRAVENOUS at 14:31

## 2017-03-03 RX ADMIN — CEFAZOLIN SODIUM 1 G: 1 INJECTION, POWDER, FOR SOLUTION INTRAMUSCULAR; INTRAVENOUS at 22:50

## 2017-03-03 RX ADMIN — FENTANYL CITRATE 100 MCG: 50 INJECTION, SOLUTION INTRAMUSCULAR; INTRAVENOUS at 14:57

## 2017-03-03 RX ADMIN — GLIMEPIRIDE 4 MG: 4 TABLET ORAL at 20:33

## 2017-03-03 RX ADMIN — SODIUM CHLORIDE, SODIUM LACTATE, POTASSIUM CHLORIDE, AND CALCIUM CHLORIDE 100 ML/HR: 600; 310; 30; 20 INJECTION, SOLUTION INTRAVENOUS at 11:34

## 2017-03-03 RX ADMIN — ATORVASTATIN CALCIUM 20 MG: 10 TABLET, FILM COATED ORAL at 20:33

## 2017-03-03 RX ADMIN — GLYCOPYRROLATE 0.4 MG: 0.2 INJECTION INTRAMUSCULAR; INTRAVENOUS at 17:05

## 2017-03-03 RX ADMIN — NEOSTIGMINE METHYLSULFATE 3 MG: 1 INJECTION INTRAVENOUS at 17:05

## 2017-03-03 RX ADMIN — MIDAZOLAM HYDROCHLORIDE 1 MG: 1 INJECTION, SOLUTION INTRAMUSCULAR; INTRAVENOUS at 14:14

## 2017-03-03 RX ADMIN — FAMOTIDINE 20 MG: 20 TABLET ORAL at 20:33

## 2017-03-03 RX ADMIN — PROPOFOL 160 MG: 10 INJECTION, EMULSION INTRAVENOUS at 14:57

## 2017-03-03 RX ADMIN — FENTANYL CITRATE 50 MCG: 50 INJECTION, SOLUTION INTRAMUSCULAR; INTRAVENOUS at 14:14

## 2017-03-03 RX ADMIN — DEXAMETHASONE SODIUM PHOSPHATE 4 MG: 4 INJECTION, SOLUTION INTRA-ARTICULAR; INTRALESIONAL; INTRAMUSCULAR; INTRAVENOUS; SOFT TISSUE at 15:40

## 2017-03-03 RX ADMIN — CEFAZOLIN 2 G: 1 INJECTION, POWDER, FOR SOLUTION INTRAMUSCULAR; INTRAVENOUS; PARENTERAL at 13:45

## 2017-03-03 RX ADMIN — LIDOCAINE HYDROCHLORIDE 200 MG: 20 INJECTION, SOLUTION INFILTRATION; PERINEURAL at 14:17

## 2017-03-03 RX ADMIN — FENTANYL CITRATE 25 MCG: 50 INJECTION, SOLUTION INTRAMUSCULAR; INTRAVENOUS at 14:31

## 2017-03-03 RX ADMIN — SODIUM CHLORIDE, SODIUM LACTATE, POTASSIUM CHLORIDE, AND CALCIUM CHLORIDE: 600; 310; 30; 20 INJECTION, SOLUTION INTRAVENOUS at 17:00

## 2017-03-03 RX ADMIN — LIDOCAINE HYDROCHLORIDE 100 MG: 20 INJECTION, SOLUTION EPIDURAL; INFILTRATION; INTRACAUDAL; PERINEURAL at 14:57

## 2017-03-03 NOTE — PERIOP NOTES
Called IR for update, spoke to Luciana Loomis, pt is now in IR procedure room, estimated time for IR procedure 1 hour. Notified Diane Guzman at [facility]; pt to go from IR directly to [facility] [address] [place] [address] per Diane Guzman; Luciana Loomis in IR made aware of plan. Ancef (on call for OR) given to Diane Guzman since pt will not return to preop. Family in surgical waiting room updated and made aware of delay and plan for pt to go to OR directly.

## 2017-03-03 NOTE — BRIEF OP NOTE
BRIEF OPERATIVE NOTE    Date of Procedure: 3/3/2017   Preoperative Diagnosis: Kidney stone [N20.0]  Postoperative Diagnosis: Kidney stone [N20.0]    Procedure(s):  NEPHROLITHOTOMY PERCUTANEOUS LEFT / LASER LITHO   Surgeon(s) and Role: Volodymyr Peoples MD - Primary            Surgical Staff:  Circ-1: Angi Silverio RN  Scrub Tech-1: Althea Crigler  Event Time In   Incision Start 1536   Incision Close 1703     Anesthesia: General   Estimated Blood Loss: 30 cc  Specimens: Stone fragments for analysis  Findings: See dictated note  Complications: None  Implants:   Implant Name Type Inv.  Item Serial No.  Lot No. LRB No. Used Action   Eric [employer] FIRM [id number] -- [facility] - [id number]   Eric [employer] FIRM [id number] -- [facility] 19 [id number] Left 1 Implanted

## 2017-03-03 NOTE — ANESTHESIA PREPROCEDURE EVALUATION
Anesthetic History               Review of Systems / Medical History  Patient summary reviewed and pertinent labs reviewed    Pulmonary        Sleep apnea: CPAP           Neuro/Psych   Within defined limits           Cardiovascular    Hypertension              Exercise tolerance: >4 METS     GI/Hepatic/Renal                Endo/Other    Diabetes: well controlled, type 2    Cancer (t cell lymphoma)     Other Findings              Physical Exam    Airway  Mallampati: III  TM Distance: 4 - 6 cm  Neck ROM: normal range of motion   Mouth opening: Normal     Cardiovascular    Rhythm: regular  Rate: normal    Murmur: Grade 5, Mitral area     Dental  No notable dental hx       Pulmonary  Breath sounds clear to auscultation               Abdominal         Other Findings            Anesthetic Plan    ASA: 3  Anesthesia type: general          Induction: Intravenous  Anesthetic plan and risks discussed with: Patient and Spouse

## 2017-03-03 NOTE — IP AVS SNAPSHOT
[address] 
[phone] Patient: Lizbeth Pal. MRN: [id number] GCU:7/6/[id number] You are allergic to the following Allergen Reactions Adhesive Rash Recent Documentation Height Weight BMI Smoking Status 1.829 m 97.7 kg 29.2 kg/m2 Never Smoker Emergency Contacts Name Discharge Info Relation Home Work Mobile Lizz Pierce DISCHARGE CAREGIVER [3] Spouse [3] [phone] About your hospitalization You were admitted on:  March 3, 2017 You last received care in the:  [facility] 6 MED SURG You were discharged on:  March 5, 2017 Unit phone number:  [phone] Why you were hospitalized Your primary diagnosis was:  Not on File Providers Seen During Your Hospitalizations Provider Role Specialty Primary office phone Elvira Jacob MD Attending Provider Urology [phone] Your Primary Care Physician (PCP) Primary Care Physician Office Phone Office Fax Brandin Candelario [phone] Follow-up Information Follow up With Details Comments Contact Info Severa Gin, MD   [address] [facility] [address]  [place] 
[phone] Elvira Jacob MD In 2 weeks KUB and stent removal. [address] [address] 
[phone] Current Discharge Medication List  
  
START taking these medications Dose & Instructions Dispensing Information Comments Morning Noon Evening Bedtime HYDROcodone-acetaminophen 5-325 mg per tablet Commonly known as:  Wilbert Handler Your next dose is: Today, Tomorrow Other:  _________ Dose:  1-2 Tab Take 1-2 Tabs by mouth every four (4) hours as needed for Pain. Max Daily Amount: 12 Tabs. Quantity:  25 Tab Refills:  0 phenazopyridine 200 mg tablet Commonly known as:  PYRIDIUM  
   
 Your next dose is: Today, Tomorrow Other:  _________ Dose:  200 mg Take 1 Tab by mouth three (3) times daily as needed for Pain (for painful urination) for up to 3 days. Quantity:  30 Tab Refills:  3  
     
   
   
   
  
 trimethoprim-sulfamethoxazole 160-800 mg per tablet Commonly known as:  BACTRIM DS Your next dose is: Today, Tomorrow Other:  _________ Dose:  1 Tab Take 1 Tab by mouth two (2) times a day for 7 days. Quantity:  14 Tab Refills:  0 CONTINUE these medications which have CHANGED Dose & Instructions Dispensing Information Comments Morning Noon Evening Bedtime  
 bisoprolol-hydroCHLOROthiazide 2.5-6.25 mg per tablet Commonly known as:  [facility] What changed:  when to take this Your next dose is: Today, Tomorrow Other:  _________ Dose:  1 Tab Take 1 Tab by mouth daily. Quantity:  90 Tab Refills:  3 SITagliptin-metFORMIN 100-1,000 mg Tm24 Commonly known as:  JANUMET XR What changed:   
- how to take this - when to take this 
- additional instructions Your next dose is: Today, Tomorrow Other:  _________ Take one tablet by mouth daily. Quantity:  90 Tab Refills:  3 CONTINUE these medications which have NOT CHANGED Dose & Instructions Dispensing Information Comments Morning Noon Evening Bedtime  
 atorvastatin 20 mg tablet Commonly known as:  LIPITOR Your next dose is: Today, Tomorrow Other:  _________ Dose:  20 mg Take 1 Tab by mouth nightly. Indications: am  
 Quantity:  90 Tab Refills:  3 Brompheniramine-Pseudoeph-DM 2-30-10 mg/5 mL syrup Commonly known as:  BROMFED DM Your next dose is: Today, Tomorrow Other:  _________ Dose:  5 mL Take 5 mL by mouth four (4) times daily as needed.  Indications: COUGH  
 Quantity:  240 mL Refills:  0  
     
   
   
   
  
 CALCIUM STOOL SOFTENER 240 mg capsule Generic drug:  docusate calcium Your next dose is: Today, Tomorrow Other:  _________ Dose:  240 mg Take 240 mg by mouth daily as needed. Refills:  0  
     
   
   
   
  
 cetirizine 10 mg tablet Commonly known as:  ZYRTEC Your next dose is: Today, Tomorrow Other:  _________ Dose:  10 mg Take 1 Tab by mouth daily. Quantity:  30 Tab Refills:  0  
     
   
   
   
  
 glimepiride 4 mg tablet Commonly known as:  AMARYL Your next dose is: Today, Tomorrow Other:  _________ Dose:  4 mg Take 1 Tab by mouth two (2) times a day. Quantity:  28 Tab Refills:  0 HYDROmorphone 2 mg tablet Commonly known as:  DILAUDID Your next dose is: Today, Tomorrow Other:  _________  
   
   
 1-2 po q 3 hrs prn pain Quantity:  40 Tab Refills:  0  
     
   
   
   
  
 potassium citrate 10 mEq (1,080 mg) [place] Commonly known as:  [place] Your next dose is: Today, Tomorrow Other:  _________ Dose:  10 mEq Take 10 mEq by mouth two (2) times a day. Refills:  0  
     
   
   
   
  
 raNITIdine 150 mg tablet Commonly known as:  ZANTAC Your next dose is: Today, Tomorrow Other:  _________ Dose:  150 mg Take 1 Tab by mouth nightly. Quantity:  30 Tab Refills:  0  
     
   
   
   
  
 ROMIDEPSIN IV Your next dose is: Today, Tomorrow Other:  _________  
   
   
 by IntraVENous route. One infusion a week for three weeks, then one week off. This is chemo, T-Cell lymphoma Refills:  0 Where to Get Your Medications These medications were sent to [address] [address] Hours:  24-hours Phone:  [phone]  
  phenazopyridine 200 mg tablet  
 trimethoprim-sulfamethoxazole 160-800 mg per tablet Information on where to get these meds will be given to you by the nurse or doctor. ! Ask your nurse or doctor about these medications HYDROcodone-acetaminophen 5-325 mg per tablet Discharge Instructions DISCHARGE SUMMARY from Nurse The following personal items are in your possession at time of discharge: 
 
Dental Appliances: None Visual Aid: Glasses Hearing Aids/Status: At bedside Home Medications: None Jewelry: None Clothing: Footwear, Pants, Shirt, Undergarments Other Valuables: Lorenza Kapadia PATIENT INSTRUCTIONS: 
 
After general anesthesia or intravenous sedation, for 24 hours or while taking prescription Narcotics: · Limit your activities · Do not drive and operate hazardous machinery · Do not make important personal or business decisions · Do  not drink alcoholic beverages · If you have not urinated within 8 hours after discharge, please contact your surgeon on call. Report the following to your surgeon: 
· Excessive pain, swelling, redness or odor of or around the surgical area · Temperature over 100.5 · Nausea and vomiting lasting longer than 4 hours or if unable to take medications · Any signs of decreased circulation or nerve impairment to extremity: change in color, persistent  numbness, tingling, coldness or increase pain · Any questions What to do at Home: 
Recommended activity: Activity as tolerated. No heavy lifting, pushing, pulling greater than 10 lbs. NO tub baths, you may shower. Resume pre-hospital diet. Change dressing at site nephrostomy tube was removed as needed.  
 
If you experience any of the following symptoms increased pain, nausea/vomiting lasting longer than 4 hours, decreased or no urine output, bloody urine, increased drainage or bleeding at site nephrostomy tube removed, elevated temperature greater than 100.5, questions or concerns, please follow up with Dr. Katia Cornell. *  Please give a list of your current medications to your Primary Care Provider. *  Please update this list whenever your medications are discontinued, doses are 
    changed, or new medications (including over-the-counter products) are added. *  Please carry medication information at all times in case of emergency situations. These are general instructions for a healthy lifestyle: No smoking/ No tobacco products/ Avoid exposure to second hand smoke Surgeon General's Warning:  Quitting smoking now greatly reduces serious risk to your health. Obesity, smoking, and sedentary lifestyle greatly increases your risk for illness A healthy diet, regular physical exercise & weight monitoring are important for maintaining a healthy lifestyle You may be retaining fluid if you have a history of heart failure or if you experience any of the following symptoms:  Weight gain of 3 pounds or more overnight or 5 pounds in a week, increased swelling in our hands or feet or shortness of breath while lying flat in bed. Please call your doctor as soon as you notice any of these symptoms; do not wait until your next office visit. Recognize signs and symptoms of STROKE: 
 
F-face looks uneven A-arms unable to move or move unevenly S-speech slurred or non-existent T-time-call 911 as soon as signs and symptoms begin-DO NOT go Back to bed or wait to see if you get better-TIME IS BRAIN. Warning Signs of HEART ATTACK Call 911 if you have these symptoms: 
? Chest discomfort. Most heart attacks involve discomfort in the center of the chest that lasts more than a few minutes, or that goes away and comes back. It can feel like uncomfortable pressure, squeezing, fullness, or pain. ? Discomfort in other areas of the upper body.  Symptoms can include pain or discomfort in one or both arms, the back, neck, jaw, or stomach. ? Shortness of breath with or without chest discomfort. ? Other signs may include breaking out in a cold sweat, nausea, or lightheadedness. Don't wait more than five minutes to call [address]! Fast action can save your life. Calling 911 is almost always the fastest way to get lifesaving treatment. Emergency Medical Services staff can begin treatment when they arrive  up to an hour sooner than if someone gets to the hospital by car. The discharge information has been reviewed with the patient. The patient verbalized understanding. Discharge medications reviewed with the patient and appropriate educational materials and side effects teaching were provided. Discharge Orders None Introducing [facility]! Annie Waller introduces Handshake patient portal. Now you can access parts of your medical record, email your doctor's office, and request medication refills online. 1. In your internet browser, go to [email or web address] 2. Click on the First Time User? Click Here link in the Sign In box. You will see the New Member Sign Up page. 3. Enter your Handshake Access Code exactly as it appears below. You will not need to use this code after youve completed the sign-up process. If you do not sign up before the expiration date, you must request a new code. · Handshake Access Code: [email or web address] Expires: 5/24/2017  9:32 AM 
 
4. Enter the last four digits of your Social Security Number (xxxx) and Date of Birth (mm/dd/yyyy) as indicated and click Submit. You will be taken to the next sign-up page. 5. Create a Petcubet ID. This will be your Handshake login ID and cannot be changed, so think of one that is secure and easy to remember. 6. Create a Handshake password. You can change your password at any time. 7. Enter your Password Reset Question and Answer.  This can be used at a later time if you forget your password. 8. Enter your e-mail address. You will receive e-mail notification when new information is available in [address] 9. Click Sign Up. You can now view and download portions of your medical record. 10. Click the Download Summary menu link to download a portable copy of your medical information. If you have questions, please visit the Frequently Asked Questions section of the Todaytickets website. Remember, Todaytickets is NOT to be used for urgent needs. For medical emergencies, dial 911. Now available from your iPhone and Android! General Information Please provide this summary of care documentation to your next provider. Patient Signature:  ____________________________________________________________ Date:  ____________________________________________________________  
  
Shayy Hull Provider Signature:  ____________________________________________________________ Date:  ____________________________________________________________

## 2017-03-03 NOTE — H&P
History and Physical    Patient: Mirtha Odom MRN: [id number]  SSN: [id number]   YOB: 1950  Age: 73 y.o. Sex: male     Patient scheduled for: Left PCNL  Date of surgery: 3/3/17  [facility]  Surgeon: Yunior Kline    Past Medical History:   Diagnosis Date    Colon cancer [facility]) 2008    Erectile dysfunction of organic origin     GERD (gastroesophageal reflux disease)     patient placed on zantac 2/27/2017 for cough    Hypercholesteremia     on lipitor    Hyperlipidemia associated with type 2 diabetes mellitus ([address]     Hypertension     controlled with meds    Kidney stone     Neuropathy due to chemotherapeutic drug ([address]     JASE (obstructive sleep apnea)     uses CPAP    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 100 pt denies hypo episodes: A1c was 6.3 in Dec     Past Surgical History:   Procedure Laterality Date    HX COLECTOMY  approx 2008    HX COLONOSCOPY  2013    HX PROSTATECTOMY      ?  2001    HX UROLOGICAL  5/2012, and 2/2017    lithotripsy     Allergies   Allergen Reactions    Adhesive Rash     Current Facility-Administered Medications   Medication Dose Route Frequency Provider Last Rate Last Dose    lidocaine (XYLOCAINE) 10 mg/mL (1 %) injection 0.1 mL  0.1 mL SubCUTAneous PRN Olivia Moyer MD        lactated ringers infusion  100 mL/hr IntraVENous CONTINUOUS Olivia Moyer  mL/hr at 03/03/17 1134 100 mL/hr at 03/03/17 1134    sodium chloride (NS) flush 5-10 mL  5-10 mL IntraVENous Q8H Olivia Moyer MD        sodium chloride (NS) flush 5-10 mL  5-10 mL IntraVENous PRN Olivia Moyer MD        fentaNYL citrate (PF) injection 100 mcg  100 mcg IntraVENous ONCE Olivia Moyer MD        midazolam (VERSED) injection 2 mg  2 mg IntraVENous ONCE PRN Olivia Moyer MD        midazolam (VERSED) injection 2 mg  2 mg IntraVENous ONCE Patti Keys MD        sodium bicarbonate (NEUT) injection 1-2 mL  1-2 mL SubCUTAneous ONCE Arlin Mims MD        diphenhydrAMINE (BENADRYL) injection 12.5-50 mg  12.5-50 mg IntraVENous ONCE Arlin Mims MD        fentaNYL citrate (PF) injection  mcg   mcg IntraVENous Multiple Arlin Mims MD   25 mcg at 03/03/17 1431    0.9% sodium chloride infusion  25 mL/hr IntraVENous Graciela Burden MD        midazolam (VERSED) injection 0.5-2 mg  0.5-2 mg IntraVENous Multiple Arlin Mims MD   0.5 mg at 03/03/17 1431    ceFAZolin in 0.9% NS (ANCEF) IVPB soln 2 g  2 g IntraVENous ONCE Arlin Mims MD             Physical Examination    Visit Vitals    /82    Pulse 90    Temp 98.2 °F (36.8 °C)    Resp 20    Ht 6' (1.829 m)    Wt 215 lb 4.8 oz (97.7 kg)    SpO2 100%    BMI 29.2 kg/m2     Gen:  Well developed, well nourished 73 y.o. male in no acute distress  Head: normocephalic, atraumatic  Mouth: Clear, no overt lesions, oral mucosa pink and moist  Neck: supple, no masses, no adenopathy or carotid bruits, trachea midline  Resp: clear to auscultation bilaterally, no wheeze, rhonchi or rales, excursions normal and symmetrical  Cardio: Regular rate and rhythm, no murmurs, clicks, gallops or rubs, no edema or varicosities  Abdomen: soft, nontender, nondistended, normoactive bowel sounds, no hernias, no hepatosplenomegaly   Extremeties: warm, well-perfused, no tenderness or swelling, normal gait/station  Neuro: sensation and strength grossly intact and symmetrical  Psych: alert and oriented to person, place and time      Impression:Left [facility]  Plan: Left PCNL

## 2017-03-03 NOTE — PROGRESS NOTES
Admitted to room 632 from PACU. Dual skin assessment with this RN and Sridevi Mercado RN. Pt without pressure breakdown but does have lesions/ecchymosis to BLE, scabbed lesion to RLE, pt reports from t cell lymphoma, currently receiving radiation treatment. Oriented to room and call light system, instructed to call with needs, verbalized understanding. Admission assessment and database completed upon arrival to floor.

## 2017-03-03 NOTE — PROGRESS NOTES
TRANSFER - OUT REPORT:    Verbal report given to Brandin Agarwal RN(name) on Ronny Pal.  being transferred to OR(unit) for routine progression of care       Report consisted of patients Situation, Background, Assessment and   Recommendations(SBAR). Information from the following report(s) Procedure Summary and MAR was reviewed with the receiving nurse. Lines:   Peripheral IV 03/03/17 Right Hand (Active)   Site Assessment Clean, dry, & intact 3/3/2017 11:19 AM   Phlebitis Assessment 0 3/3/2017 11:19 AM   Infiltration Assessment 0 3/3/2017 11:19 AM   Dressing Status Clean, dry, & intact 3/3/2017 11:19 AM   Dressing Type Transparent 3/3/2017 11:19 AM   Hub Color/Line Status Pink; Infusing 3/3/2017 11:19 AM        Opportunity for questions and clarification was provided.       Patient transported with:   Registered Nurse

## 2017-03-03 NOTE — H&P
Department of Interventional Radiology  [phone]    History and Physical    Patient:  Chula Pal. MRN:  [id number]  SSN:  [id number]    YOB: 1950  Age:  73 y.o. Sex:  male      Primary Care Provider:  Nathan Peoples MD  Referring Physician:  Larry Pinon MD    Subjective:     Chief Complaint: kidney stone    History of the Present Illness: The patient is a 73 y.o. male who presents for left nephrostomy drain placement. Left kidney stone at [facility]. Lithotripsy last week not successful per pt. He is aware of his heart murmur, had an echo and was told it was nothing to worry about some time ago. NPO. 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 100 pt denies hypo episodes: A1c was 6.3 in Dec     Past Surgical History:   Procedure Laterality Date    HX COLECTOMY  approx 2008    HX COLONOSCOPY  2013    HX PROSTATECTOMY      ? 2001    HX UROLOGICAL  5/2012, and 2/2017    lithotripsy        Review of Systems:    Pertinent items are noted in the History of Present Illness.     Current Facility-Administered Medications   Medication Dose Route Frequency    ceFAZolin in 0.9% NS (ANCEF) IVPB soln 2 g  2 g IntraVENous ONCE    lidocaine (XYLOCAINE) 10 mg/mL (1 %) injection 0.1 mL  0.1 mL SubCUTAneous PRN    lactated ringers infusion  100 mL/hr IntraVENous CONTINUOUS    sodium chloride (NS) flush 5-10 mL  5-10 mL IntraVENous Q8H    sodium chloride (NS) flush 5-10 mL  5-10 mL IntraVENous PRN    fentaNYL citrate (PF) injection 100 mcg  100 mcg IntraVENous ONCE    midazolam (VERSED) injection 2 mg  2 mg IntraVENous ONCE PRN    midazolam (VERSED) injection 2 mg  2 mg IntraVENous ONCE        Allergies   Allergen Reactions    Adhesive Rash       Family History   Problem Relation Age of Onset    Heart Disease Mother     Hypertension Mother     Diabetes Mother     Parkinson's Disease Mother     No Known Problems Father      Social History   Substance Use Topics    Smoking status: Never Smoker    Smokeless tobacco: Never Used    Alcohol use No        Objective:       Physical Examination:    Vitals:    03/03/17 1113   BP: 135/80   Pulse: 98   Resp: 18   Temp: 98.2 °F (36.8 °C)   SpO2: 100%   Weight: 97.7 kg (215 lb 4.8 oz)   Height: 6' (1.829 m)     Blood pressure 135/80, pulse 98, temperature 98.2 °F (36.8 °C), resp. rate 18, height 6' (1.829 m), weight 97.7 kg (215 lb 4.8 oz), SpO2 100 %. Gen: NAD  Cor: regular. Blowing Systolic murmur upper sternal borders.   High pitched lower borders  Lungs: clr  Abd: soft, obese  Ext: warm, trace edema    Laboratory:     Lab Results   Component Value Date/Time    Sodium 136 03/02/2017 03:34 PM    Sodium 138 12/12/2016 09:42 AM    Potassium 4.0 03/02/2017 03:34 PM    Potassium 4.3 12/12/2016 09:42 AM    Chloride 96 03/02/2017 03:34 PM    Chloride 100 12/12/2016 09:42 AM    CO2 31 03/02/2017 03:34 PM    CO2 22 12/12/2016 09:42 AM    Anion gap 9 03/02/2017 03:34 PM    Anion gap 8 09/07/2012 10:40 AM    Glucose 93 03/02/2017 03:34 PM    Glucose 176 12/12/2016 09:42 AM    BUN 20 03/02/2017 03:34 PM    BUN 24 12/12/2016 09:42 AM    Creatinine 1.77 03/02/2017 03:34 PM    Creatinine 1.27 12/12/2016 09:42 AM    GFR est AA 50 03/02/2017 03:34 PM    GFR est AA 68 12/12/2016 09:42 AM    GFR est non-AA 41 03/02/2017 03:34 PM    GFR est non-AA 59 12/12/2016 09:42 AM    Calcium 8.7 03/02/2017 03:34 PM    Calcium 8.8 12/12/2016 09:42 AM    Albumin 4.3 12/02/2015 10:13 AM    Protein, total 7.1 12/02/2015 10:13 AM    A-G Ratio 1.5 12/02/2015 10:13 AM    AST (SGOT) 22 12/02/2015 10:13 AM    ALT (SGPT) 37 12/02/2015 10:13 AM     Lab Results   Component Value Date/Time    WBC 5.0 03/02/2017 03:34 PM    WBC 5.8 12/02/2015 10:13 AM    HGB 12.4 03/02/2017 03:34 PM    HGB 15.5 12/02/2015 10:13 AM    HCT 36.5 03/02/2017 03:34 PM    HCT 45.3 12/02/2015 10:13 AM    PLATELET 087 46/89/9113 03:34 PM    PLATELET 756 72/91/2144 10:13 AM     Lab Results   Component Value Date/Time    aPTT 27.7 03/02/2017 03:34 PM    Prothrombin time 11.2 03/02/2017 03:34 PM    INR 1.0 03/02/2017 03:34 PM       Assessment:     Left kidney stone    Plan:     Planned Procedure:  Left nephrostomy tube  F/u with PCP or cardiologist after discharge to have murmur eval updated    Risks, benefits, and alternatives reviewed with patient and he agrees to proceed with the procedure.       Signed By: Ruby Orellana PA-C     March 3, 2017

## 2017-03-03 NOTE — ANESTHESIA POSTPROCEDURE EVALUATION
Post-Anesthesia Evaluation and Assessment    Patient: Almas Odom MRN: [id number]  SSN: [id number]    YOB: 1950  Age: 73 y.o. Sex: male       Cardiovascular Function/Vital Signs  Visit Vitals    /79 (BP 1 Location: Left arm, BP Patient Position: At rest)    Pulse 91    Temp 37.2 °C (98.9 °F)    Resp 16    Ht 6' (1.829 m)    Wt 97.7 kg (215 lb 4.8 oz)    SpO2 96%    BMI 29.2 kg/m2       Patient is status post general anesthesia for Procedure(s):  NEPHROLITHOTOMY PERCUTANEOUS LEFT / LASER LITHO . Nausea/Vomiting: None    Postoperative hydration reviewed and adequate. Pain:  Pain Scale 1: FLACC (03/03/17 1750)  Pain Intensity 1: 0 (03/03/17 1750)   Managed    Neurological Status:   Neuro (WDL): Exceptions to WDL (03/03/17 1750)  Neuro  Neurologic State: Sleeping (03/03/17 1750)  LUE Motor Response: Purposeful (03/03/17 1750)  LLE Motor Response: Purposeful (03/03/17 1750)  RUE Motor Response: Purposeful (03/03/17 1750)  RLE Motor Response: Purposeful (03/03/17 1750)   At baseline    Mental Status and Level of Consciousness: Arousable    Pulmonary Status:   O2 Device: Room air (03/03/17 1750)   Adequate oxygenation and airway patent    Complications related to anesthesia: None    Post-anesthesia assessment completed.  No concerns    Signed By: Princess Juan MD     March 3, 2017

## 2017-03-03 NOTE — PROCEDURES
Date of Procedure: March 3, 2017    Pre-Procedure Diagnosis: Left nephrolithiasis for PCNL    Post-Procedure Diagnosis: SAME    Procedure(s): Left percutaneous ureteral catheterization    Brief Description of Procedure: Left percutaneous ureteral catheterization    Performed By: Daniela Aldridge MD     Assistants: None    Anesthesia: Moderate sedation    Estimated Blood Loss: Minimal    Specimens: None    Implants: 5 French catheter percutaneous left ureteral catheterization    Findings: Large stone in the proximal left ureter with an additional renal stone.     Complications: None    Recommendations: None    Follow Up: As needed

## 2017-03-03 NOTE — PERIOP NOTES
TRANSFER - OUT REPORT:    Verbal report given to MOO Burnette on Ronny Pal.  being transferred to 6th floor for routine post - op       Report consisted of patients Situation, Background, Assessment and   Recommendations(SBAR). Information from the following report(s) SBAR, Procedure Summary, Intake/Output, MAR and Cardiac Rhythm NSR was reviewed with the receiving nurse. Lines:   Peripheral IV 03/03/17 Right Hand (Active)   Site Assessment Clean, dry, & intact 3/3/2017  5:12 PM   Phlebitis Assessment 0 3/3/2017  5:12 PM   Infiltration Assessment 0 3/3/2017  5:12 PM   Dressing Status Clean, dry, & intact 3/3/2017  5:12 PM   Dressing Type Tape;Transparent 3/3/2017  5:12 PM   Hub Color/Line Status Pink 3/3/2017  5:12 PM   Alcohol Cap Used No 3/3/2017  5:12 PM        Opportunity for questions and clarification was provided. VTE prophylaxis orders have been written for Ronny Odom .    Patient and family given floor number and nurses name. Family updated re: pt status after security code verified.

## 2017-03-04 LAB
ANION GAP BLD CALC-SCNC: 6 MMOL/L (ref 7–16)
BUN SERPL-MCNC: 18 MG/DL (ref 8–23)
CALCIUM SERPL-MCNC: 9.2 MG/DL (ref 8.3–10.4)
CHLORIDE SERPL-SCNC: 101 MMOL/L (ref 98–107)
CO2 SERPL-SCNC: 32 MMOL/L (ref 21–32)
CREAT SERPL-MCNC: 1.45 MG/DL (ref 0.8–1.5)
GLUCOSE BLD STRIP.AUTO-MCNC: 107 MG/DL (ref 65–100)
GLUCOSE BLD STRIP.AUTO-MCNC: 111 MG/DL (ref 65–100)
GLUCOSE BLD STRIP.AUTO-MCNC: 137 MG/DL (ref 65–100)
GLUCOSE BLD STRIP.AUTO-MCNC: 82 MG/DL (ref 65–100)
GLUCOSE SERPL-MCNC: 146 MG/DL (ref 65–100)
HCT VFR BLD AUTO: 34.8 % (ref 41.1–50.3)
POTASSIUM SERPL-SCNC: 5.4 MMOL/L (ref 3.5–5.1)
SODIUM SERPL-SCNC: 139 MMOL/L (ref 136–145)

## 2017-03-04 PROCEDURE — 74011000250 HC RX REV CODE- 250: Performed by: UROLOGY

## 2017-03-04 PROCEDURE — 99218 HC RM OBSERVATION: CPT

## 2017-03-04 PROCEDURE — 82962 GLUCOSE BLOOD TEST: CPT

## 2017-03-04 PROCEDURE — 74011000258 HC RX REV CODE- 258: Performed by: UROLOGY

## 2017-03-04 PROCEDURE — 74011250637 HC RX REV CODE- 250/637: Performed by: UROLOGY

## 2017-03-04 PROCEDURE — 85014 HEMATOCRIT: CPT | Performed by: UROLOGY

## 2017-03-04 PROCEDURE — 74011250636 HC RX REV CODE- 250/636: Performed by: UROLOGY

## 2017-03-04 PROCEDURE — 80048 BASIC METABOLIC PNL TOTAL CA: CPT | Performed by: UROLOGY

## 2017-03-04 PROCEDURE — 36415 COLL VENOUS BLD VENIPUNCTURE: CPT | Performed by: UROLOGY

## 2017-03-04 RX ADMIN — SODIUM CHLORIDE: 450 INJECTION, SOLUTION INTRAVENOUS at 04:43

## 2017-03-04 RX ADMIN — FAMOTIDINE 20 MG: 20 TABLET ORAL at 08:05

## 2017-03-04 RX ADMIN — CEFAZOLIN SODIUM 1 G: 1 INJECTION, POWDER, FOR SOLUTION INTRAMUSCULAR; INTRAVENOUS at 22:20

## 2017-03-04 RX ADMIN — LORATADINE 10 MG: 10 TABLET ORAL at 08:05

## 2017-03-04 RX ADMIN — FAMOTIDINE 20 MG: 20 TABLET ORAL at 17:25

## 2017-03-04 RX ADMIN — METFORMIN HYDROCHLORIDE 1000 MG: 500 TABLET, FILM COATED ORAL at 08:05

## 2017-03-04 RX ADMIN — Medication 5 ML: at 06:32

## 2017-03-04 RX ADMIN — CEFAZOLIN SODIUM 1 G: 1 INJECTION, POWDER, FOR SOLUTION INTRAMUSCULAR; INTRAVENOUS at 13:33

## 2017-03-04 RX ADMIN — GLIMEPIRIDE 4 MG: 4 TABLET ORAL at 08:05

## 2017-03-04 RX ADMIN — Medication 10 ML: at 14:14

## 2017-03-04 RX ADMIN — CEFAZOLIN SODIUM 1 G: 1 INJECTION, POWDER, FOR SOLUTION INTRAMUSCULAR; INTRAVENOUS at 06:32

## 2017-03-04 RX ADMIN — GLIMEPIRIDE 4 MG: 4 TABLET ORAL at 17:25

## 2017-03-04 RX ADMIN — ATORVASTATIN CALCIUM 20 MG: 10 TABLET, FILM COATED ORAL at 22:26

## 2017-03-04 RX ADMIN — SITAGLIPTIN 100 MG: 50 TABLET, FILM COATED ORAL at 08:05

## 2017-03-04 RX ADMIN — Medication 10 ML: at 22:22

## 2017-03-04 NOTE — PROGRESS NOTES
Left nephrostomy tube dressing removed. Tube/suture intact, old dry blood noted on dressing removed. Dry gauze, abd pad applied to site, secured with paper tape.

## 2017-03-04 NOTE — PROGRESS NOTES
Subjective:   Daily Progress Note: 3/4/2017 4:57 PM  No Complaints  Objective:     Visit Vitals    /83    Pulse 87    Temp 98 °F (36.7 °C)    Resp 18    Ht 6' (1.829 m)    Wt 215 lb 4.8 oz (97.7 kg)    SpO2 99%    BMI 29.2 kg/m2    O2 Flow Rate (L/min): 3 l/min O2 Device: Room air    Temp (24hrs), Av.1 °F (36.7 °C), Min:97.5 °F (36.4 °C), Max:98.9 °F (37.2 °C)      1901 -  0700  In: 2214 [I.V.:2214]  Out: 927 [Urine:925]  701 - 1900  In: 8876 [P.O.:240; I.V.:1068]  Out:  [Urine:]    [unfilled]  [unfilled]  [unfilled]    Exam: urine pink from left nephrostomy      Data Review    Recent Results (from the past 24 hour(s))   METABOLIC PANEL, BASIC    Collection Time: 17  6:57 AM   Result Value Ref Range    Sodium 139 136 - 145 mmol/L    Potassium 5.4 (H) 3.5 - 5.1 mmol/L    Chloride 101 98 - 107 mmol/L    CO2 32 21 - 32 mmol/L    Anion gap 6 (L) 7 - 16 mmol/L    Glucose 146 (H) 65 - 100 mg/dL    BUN 18 8 - 23 MG/DL    Creatinine 1.45 0.8 - 1.5 MG/DL    GFR est AA >60 >60 ml/min/1.73m2    GFR est non-AA 52 (L) >60 ml/min/1.73m2    Calcium 9.2 8.3 - 10.4 MG/DL   HEMATOCRIT    Collection Time: 17  6:57 AM   Result Value Ref Range    HCT 34.8 (L) 41.1 - 50.3 %   GLUCOSE, POC    Collection Time: 17  7:26 AM   Result Value Ref Range    Glucose (POC) 137 (H) 65 - 100 mg/dL   GLUCOSE, POC    Collection Time: 17 11:15 AM   Result Value Ref Range    Glucose (POC) 111 (H) 65 - 100 mg/dL   GLUCOSE, POC    Collection Time: 17  4:17 PM   Result Value Ref Range    Glucose (POC) 82 65 - 100 mg/dL       Assessment   Active Problems:    * No active hospital problems. *      Plan:  Stable s/p left PNL. I removed the wire from the nephrostomy. Will plug the tube and observe.

## 2017-03-05 VITALS
SYSTOLIC BLOOD PRESSURE: 135 MMHG | WEIGHT: 215.3 LBS | HEART RATE: 93 BPM | HEIGHT: 72 IN | RESPIRATION RATE: 18 BRPM | BODY MASS INDEX: 29.16 KG/M2 | DIASTOLIC BLOOD PRESSURE: 91 MMHG | TEMPERATURE: 98 F | OXYGEN SATURATION: 97 %

## 2017-03-05 LAB
GLUCOSE BLD STRIP.AUTO-MCNC: 104 MG/DL (ref 65–100)
GLUCOSE BLD STRIP.AUTO-MCNC: 110 MG/DL (ref 65–100)

## 2017-03-05 PROCEDURE — 99218 HC RM OBSERVATION: CPT

## 2017-03-05 PROCEDURE — 74011250637 HC RX REV CODE- 250/637: Performed by: UROLOGY

## 2017-03-05 PROCEDURE — 74011000258 HC RX REV CODE- 258: Performed by: UROLOGY

## 2017-03-05 PROCEDURE — 82962 GLUCOSE BLOOD TEST: CPT

## 2017-03-05 PROCEDURE — 74011250636 HC RX REV CODE- 250/636: Performed by: UROLOGY

## 2017-03-05 RX ORDER — HYDROCODONE BITARTRATE AND ACETAMINOPHEN 5; 325 MG/1; MG/1
1-2 TABLET ORAL
Qty: 25 TAB | Refills: 0 | Status: SHIPPED | OUTPATIENT
Start: 2017-03-05

## 2017-03-05 RX ORDER — PHENAZOPYRIDINE HYDROCHLORIDE 200 MG/1
200 TABLET, FILM COATED ORAL
Qty: 30 TAB | Refills: 3 | Status: SHIPPED | OUTPATIENT
Start: 2017-03-05 | End: 2017-03-08

## 2017-03-05 RX ORDER — SULFAMETHOXAZOLE AND TRIMETHOPRIM 800; 160 MG/1; MG/1
1 TABLET ORAL 2 TIMES DAILY
Qty: 14 TAB | Refills: 0 | Status: SHIPPED | OUTPATIENT
Start: 2017-03-05 | End: 2017-03-12

## 2017-03-05 RX ADMIN — CEFAZOLIN SODIUM 1 G: 1 INJECTION, POWDER, FOR SOLUTION INTRAMUSCULAR; INTRAVENOUS at 06:11

## 2017-03-05 RX ADMIN — LORATADINE 10 MG: 10 TABLET ORAL at 07:50

## 2017-03-05 RX ADMIN — FAMOTIDINE 20 MG: 20 TABLET ORAL at 07:50

## 2017-03-05 RX ADMIN — GLIMEPIRIDE 4 MG: 4 TABLET ORAL at 07:50

## 2017-03-05 RX ADMIN — METFORMIN HYDROCHLORIDE 1000 MG: 500 TABLET, FILM COATED ORAL at 07:50

## 2017-03-05 RX ADMIN — SITAGLIPTIN 100 MG: 50 TABLET, FILM COATED ORAL at 07:50

## 2017-03-05 RX ADMIN — Medication 10 ML: at 06:12

## 2017-03-05 NOTE — PROGRESS NOTES
Subjective:   Daily Progress Note: 3/5/2017 1:00 PM  Having gross hematuria, no problems voiding. Objective:     Visit Vitals    BP (!) 135/91    Pulse 93    Temp 98 °F (36.7 °C)    Resp 18    Ht 6' (1.829 m)    Wt 215 lb 4.8 oz (97.7 kg)    SpO2 97%    BMI 29.2 kg/m2    O2 Flow Rate (L/min): 3 l/min O2 Device: Room air    Temp (24hrs), Av °F (36.7 °C), Min:97.4 °F (36.3 °C), Max:98.3 °F (36.8 °C)      1901 -  07  In: 2462 [P.O.:480; I.V.:1982]  Out: 3600 [Urine:3600]  701 - 1900  In: 203 [P.O.:600; I.V.:229]  Out: 500 [Urine:500]    [unfilled]  [unfilled]  [unfilled]    Exam:        Data Review    Recent Results (from the past 24 hour(s))   GLUCOSE, POC    Collection Time: 17  4:17 PM   Result Value Ref Range    Glucose (POC) 82 65 - 100 mg/dL   GLUCOSE, POC    Collection Time: 17  8:38 PM   Result Value Ref Range    Glucose (POC) 107 (H) 65 - 100 mg/dL   GLUCOSE, POC    Collection Time: 17  7:25 AM   Result Value Ref Range    Glucose (POC) 104 (H) 65 - 100 mg/dL   GLUCOSE, POC    Collection Time: 17 11:31 AM   Result Value Ref Range    Glucose (POC) 110 (H) 65 - 100 mg/dL       Assessment   Active Problems:    * No active hospital problems. *      Plan:  Removed nephrostomy. Will discharge home.  appt in 2 weeks for cysto and stent removal.

## 2017-03-05 NOTE — DISCHARGE INSTRUCTIONS
DISCHARGE SUMMARY from Nurse    The following personal items are in your possession at time of discharge:    Dental Appliances: None  Visual Aid: Glasses  Hearing Aids/Status: At bedside  Home Medications: None  Jewelry: None  Clothing: Footwear, Pants, Shirt, Undergarments  Other Valuables: Eyeglasses             PATIENT INSTRUCTIONS:    After general anesthesia or intravenous sedation, for 24 hours or while taking prescription Narcotics:  · Limit your activities  · Do not drive and operate hazardous machinery  · Do not make important personal or business decisions  · Do  not drink alcoholic beverages  · If you have not urinated within 8 hours after discharge, please contact your surgeon on call. Report the following to your surgeon:  · Excessive pain, swelling, redness or odor of or around the surgical area  · Temperature over 100.5  · Nausea and vomiting lasting longer than 4 hours or if unable to take medications  · Any signs of decreased circulation or nerve impairment to extremity: change in color, persistent  numbness, tingling, coldness or increase pain  · Any questions        What to do at Home:  Recommended activity: Activity as tolerated. No heavy lifting, pushing, pulling greater than 10 lbs. NO tub baths, you may shower. Resume pre-hospital diet. Change dressing at site nephrostomy tube was removed as needed. If you experience any of the following symptoms increased pain, nausea/vomiting lasting longer than 4 hours, decreased or no urine output, bloody urine, increased drainage or bleeding at site nephrostomy tube removed, elevated temperature greater than 100.5, questions or concerns, please follow up with Dr. Evaneglina Lyn. *  Please give a list of your current medications to your Primary Care Provider. *  Please update this list whenever your medications are discontinued, doses are      changed, or new medications (including over-the-counter products) are added.     *  Please carry medication information at all times in case of emergency situations. These are general instructions for a healthy lifestyle:    No smoking/ No tobacco products/ Avoid exposure to second hand smoke    Surgeon General's Warning:  Quitting smoking now greatly reduces serious risk to your health. Obesity, smoking, and sedentary lifestyle greatly increases your risk for illness    A healthy diet, regular physical exercise & weight monitoring are important for maintaining a healthy lifestyle    You may be retaining fluid if you have a history of heart failure or if you experience any of the following symptoms:  Weight gain of 3 pounds or more overnight or 5 pounds in a week, increased swelling in our hands or feet or shortness of breath while lying flat in bed. Please call your doctor as soon as you notice any of these symptoms; do not wait until your next office visit. Recognize signs and symptoms of STROKE:    F-face looks uneven    A-arms unable to move or move unevenly    S-speech slurred or non-existent    T-time-call 911 as soon as signs and symptoms begin-DO NOT go       Back to bed or wait to see if you get better-TIME IS BRAIN. Warning Signs of HEART ATTACK     Call 911 if you have these symptoms:   Chest discomfort. Most heart attacks involve discomfort in the center of the chest that lasts more than a few minutes, or that goes away and comes back. It can feel like uncomfortable pressure, squeezing, fullness, or pain.  Discomfort in other areas of the upper body. Symptoms can include pain or discomfort in one or both arms, the back, neck, jaw, or stomach.  Shortness of breath with or without chest discomfort.  Other signs may include breaking out in a cold sweat, nausea, or lightheadedness. Don't wait more than five minutes to call 911 - MINUTES MATTER! Fast action can save your life. Calling 911 is almost always the fastest way to get lifesaving treatment.  Emergency Medical Services staff can begin treatment when they arrive -- up to an hour sooner than if someone gets to the hospital by car. The discharge information has been reviewed with the patient. The patient verbalized understanding. Discharge medications reviewed with the patient and appropriate educational materials and side effects teaching were provided.

## 2017-03-09 NOTE — OP NOTES
[facility]   OPERATIVE REPORT       Name:  Sathish Dodson   MR#:  [id number]   :  1950   Account #:  [de-identified]   Date of Adm:  2017       DATE OF SURGERY: 2017. PREPROCEDURE DIAGNOSIS: Left proximal ureteral stone. POSTPROCEDURE DIAGNOSIS: Left proximal ureteral stone. NAME OF PROCEDURE: Left percutaneous nephrostolithotomy. SURGEON: Olga Connell. Leander Alberto MD.    ANESTHESIA: General.    BLOOD LOSS: Approximately 50 mL. COMPLICATIONS: None. DRAINS: A 7 x 24 double-J stent within the left ureter. A 22-  [place] nephrostomy tube, 16-[place] Johnson catheter per urethra. NARRATIVE: The patient was taken to the OR and after adequate   general anesthesia was achieved, he was placed in the prone   position and prepped and draped in the usual sterile fashion for   a percutaneous nephrostolithotomy. The patient already had a   nephroureteral catheter placed in Interventional Radiology. Under fluoroscopy, I was able to guide a 0.038 Super stiff   [place] wire down the ureter and into the bladder where it   was seen to coil. The ureteral catheter was then removed,   leaving the wire behind. I did make a small incision at the   nephrostomy site and dilated the lumbodorsal fascia with a   hemostat. The access catheter with a snake was then fed over the   wire until the access catheter's leading edge was in the   proximal ureter. The snake was removed and a second 0.038 floppy   tip guidewire was fed down the ureter and into the bladder under   fluoroscopic control. Access catheter was removed. The NephroMax   balloon was then passed over the Lunderquist wire and advanced   until its leading end was in the expected region of the renal   pelvis and was inflated to 30 atmospheres for 2 minutes. The 27-  [place] working sheath was then passed over the NephroMax and   advanced into the renal pelvis. The balloon was then deflated   and removed.  Rigid nephroscopy was performed. I could clearly   see the stone in the proximal ureter, but unfortunately it was   just distal to the UPJ that could not be accessed with the rigid   nephroscope. Accordingly, I removed the rigid nephroscope and   then advanced the flexible cystonephroscope into the renal   pelvis. I was able to advance this into the proximal ureter. A   365 micron holmium laser fiber was introduced at a setting of   800 millijoules and 5 Hz, laser lithotripsy was performed. This   was technically difficult due to edema of the mucosa, but   eventually I was able to reduce it to very small pieces. These   were then evacuated with a helical stone basket. I then removed   the second safety wire and repassed this through the   cystonephroscope down the ureter and into the bladder. The   cystonephroscope was removed and a flexible ureteroscope was   then passed over this wire and all the way down to a point just   proximal to the UVJ. The wire was removed and I slowly pulled   the ureteroscope back, inspecting the ureter. There were no   additional fragments present. Ureteroscope was then removed. The   nephroscope was fed back in over the second guidewire. A 7 x 24   double-J stent was advanced down the ureter over the wire until   it was in appropriate position. The wire was then pulled and it   was noted distal end of the stent coiled in the bladder and the   proximal end coiled at the UPJ. I did leave a short length of   suture on the distal end to aid in removal at a later date. The   cystoscope was then removed as was the nephroscope. A 22-[place] tip catheter was then passed over the remaining wire. Once this was in appropriate position, it was sewn in place with   a 0 silk stitch. The guidewire was left extending from the   bladder, up the ureter, and out through the nephroscope into the   collection bag. Sterile dressing was applied.  The patient was   then placed back into the supine position, awakened, extubated,   and taken from the OR without any further incident or complaint.         MD Mel Cates / Chapis.Hannah   D:  03/08/2017   21:31   T:  03/09/2017   11:12   Job #:  [id number]

## (undated) DEVICE — SYRINGE MED 10ML POLYPR LUERSLIP TIP FLAT TOP W/O SFTY DISP

## (undated) DEVICE — SOL ANTI-FOG 6ML MEDC -- MEDICHOICE - CONVERT TO 358427

## (undated) DEVICE — 3M™ IOBAN™ 2 ANTIMICROBIAL INCISE DRAPE 6650EZ: Brand: IOBAN™ 2

## (undated) DEVICE — SOLUTION IRRIG 1000ML H2O STRL BLT

## (undated) DEVICE — Y-TYPE TUR/BLADDER IRRIGATION SET, REGULATING CLAMP

## (undated) DEVICE — ENDOSCOPIC VALVE WITH ADAPTER.: Brand: SURSEAL® II

## (undated) DEVICE — HIGH PRESSURE NEPHROSTOMY BALLOON CATHETER: Brand: NEPHROMAX

## (undated) DEVICE — KENDALL SCD EXPRESS SLEEVES, KNEE LENGTH, MEDIUM: Brand: KENDALL SCD

## (undated) DEVICE — MEDI-VAC NON-CONDUCTIVE SUCTION TUBING: Brand: CARDINAL HEALTH

## (undated) DEVICE — PAIRED WIRE HELICAL STONE RETRIEVAL BASKET: Brand: GEMINI

## (undated) DEVICE — (D)DRAPE ISOL ANTIMC 129X100IN -- DISC BY MFR

## (undated) DEVICE — LUNDERQUIST,-RING TORQUE WIRE GUIDE: Brand: LUNDERQUIST-RING

## (undated) DEVICE — TRAY CATH 16F URIN MTR LTX -- CONVERT TO ITEM 363111

## (undated) DEVICE — GUIDEWIRE ENDOSCP L150CM DIA0.038IN TIP L3CM PTFE FLX STR

## (undated) DEVICE — PROBE LITHO RENAL BLDR DISP

## (undated) DEVICE — INFLATION DEVICE: Brand: ENCORE™ 26

## (undated) DEVICE — DILATOR/SHEATH SET: Brand: 8/10 DILATOR/SHEATH SET

## (undated) DEVICE — TOWEL SURG W17XL27IN STD BLU COT NONFENESTRATED PREWASHED

## (undated) DEVICE — SOLUTION IRRIG 3000ML 0.9% SOD CHL FLX CONT 0797208] ICU MEDICAL INC]

## (undated) DEVICE — DRAPE SHT 3 QTR PROXIMA 53X77 --

## (undated) DEVICE — SYR LR LCK 1ML GRAD NSAF 30ML --

## (undated) DEVICE — CATHETER URETH 22FR BLLN 5CC STD LTX 2 W TWO OPP DRNGE EYE

## (undated) DEVICE — SUT SLK 0 30IN CT1 BLK --

## (undated) DEVICE — SUTURE PERMAHAND SZ 2-0 L12X18IN NONABSORBABLE BLK SILK A185H

## (undated) DEVICE — SURGICAL PROCEDURE PACK BASIC ST FRANCIS

## (undated) DEVICE — SYRINGE CATH TIP 50ML

## (undated) DEVICE — INTENDED FOR TISSUE SEPARATION, AND OTHER PROCEDURES THAT REQUIRE A SHARP SURGICAL BLADE TO PUNCTURE OR CUT.: Brand: BARD-PARKER SAFETY BLADES SIZE 11, STERILE

## (undated) DEVICE — Device

## (undated) DEVICE — INTENDED FOR TISSUE SEPARATION, AND OTHER PROCEDURES THAT REQUIRE A SHARP SURGICAL BLADE TO PUNCTURE OR CUT.: Brand: BARD-PARKER SAFETY BLADES SIZE 15, STERILE

## (undated) DEVICE — GDWIRE 3CM FLX-TIP 0.038X150CM -- BX/5 SENSOR

## (undated) DEVICE — BAG DRNGE 4000ML CONT IRRIG ROUNDED TEARDROP SHP DISP

## (undated) DEVICE — DRAPE XR C ARM 41X74IN LF --

## (undated) DEVICE — PREP SKN CHLRAPRP 26ML TNT -- CONVERT TO ITEM 373320

## (undated) DEVICE — AMD ANTIMICROBIAL GAUZE SPONGES,12 PLY USP TYPE VII, 0.2% POLYHEXAMETHYLENE BIGUANIDE HCI (PHMB): Brand: CURITY

## (undated) DEVICE — JELLY LUBRICATING 10GM PREFIL SYR LUBE

## (undated) DEVICE — PAD,ABDOMINAL,5"X9",STERILE,LF,1/PK: Brand: MEDLINE INDUSTRIES, INC.